# Patient Record
Sex: FEMALE | Race: WHITE | NOT HISPANIC OR LATINO | Employment: FULL TIME | URBAN - METROPOLITAN AREA
[De-identification: names, ages, dates, MRNs, and addresses within clinical notes are randomized per-mention and may not be internally consistent; named-entity substitution may affect disease eponyms.]

---

## 2020-08-18 ENCOUNTER — TELEPHONE (OUTPATIENT)
Dept: UROLOGY | Facility: CLINIC | Age: 55
End: 2020-08-18

## 2020-08-21 ENCOUNTER — TELEPHONE (OUTPATIENT)
Dept: UROLOGY | Facility: CLINIC | Age: 55
End: 2020-08-21

## 2020-08-21 NOTE — TELEPHONE ENCOUNTER
No documentation regarding call patient received. Confirmed appt and records w/patient.    ----- Message from Mariah Brito sent at 8/21/2020 10:04 AM CDT -----  Regarding: missed call  Type: Patient Call Back    Who called:Self    What is the request in detail:Returning missed call to office    Can the clinic reply by [facility]? no    Would the patient rather a call back or a response via [facility]? Callback    Best call back number:[phone]

## 2020-08-24 ENCOUNTER — TELEPHONE (OUTPATIENT)
Dept: UROLOGY | Facility: CLINIC | Age: 55
End: 2020-08-24

## 2020-08-24 NOTE — TELEPHONE ENCOUNTER
Gave fax # to patient for records.    ----- Message from Teresa Erazo MA sent at 8/21/2020 11:38 AM CDT -----    ----- Message -----  From: Jada Stewart  Sent: 8/21/2020  10:29 AM CDT  To: Maximiliano Ellis Staff    Type: Patient Call Back       What is the request in detail:  pt calling to speak to a nurse regarding health.      Can the clinic reply by [facility]? No       Would the patient rather a call back or a response via [facility]? Call back       Best call back number: [phone]

## 2020-08-31 ENCOUNTER — OFFICE VISIT (OUTPATIENT)
Dept: UROLOGY | Facility: CLINIC | Age: 55
End: 2020-08-31
Attending: UROLOGY
Payer: COMMERCIAL

## 2020-08-31 VITALS
BODY MASS INDEX: 23.66 KG/M2 | HEART RATE: 72 BPM | DIASTOLIC BLOOD PRESSURE: 57 MMHG | SYSTOLIC BLOOD PRESSURE: 89 MMHG | WEIGHT: 142 LBS | HEIGHT: 65 IN

## 2020-08-31 DIAGNOSIS — Z96.0 RETAINED URETERAL STENT: ICD-10-CM

## 2020-08-31 DIAGNOSIS — N21.0 BLADDER STONE: Primary | ICD-10-CM

## 2020-08-31 DIAGNOSIS — Z01.818 PRE-OP TESTING: ICD-10-CM

## 2020-08-31 DIAGNOSIS — N26.1 ATROPHY OF KIDNEY (TERMINAL): ICD-10-CM

## 2020-08-31 DIAGNOSIS — R31.0 GROSS HEMATURIA: ICD-10-CM

## 2020-08-31 DIAGNOSIS — Z96.0 RETAINED URETERAL STENT: Primary | ICD-10-CM

## 2020-08-31 PROCEDURE — 99204 PR OFFICE/OUTPT VISIT, NEW, LEVL IV, 45-59 MIN: ICD-10-PCS | Mod: S$GLB,,, | Performed by: UROLOGY

## 2020-08-31 PROCEDURE — 87086 URINE CULTURE/COLONY COUNT: CPT

## 2020-08-31 PROCEDURE — 99204 OFFICE O/P NEW MOD 45 MIN: CPT | Mod: S$GLB,,, | Performed by: UROLOGY

## 2020-08-31 PROCEDURE — 87088 URINE BACTERIA CULTURE: CPT

## 2020-08-31 PROCEDURE — 3008F PR BODY MASS INDEX (BMI) DOCUMENTED: ICD-10-PCS | Mod: CPTII,S$GLB,, | Performed by: UROLOGY

## 2020-08-31 PROCEDURE — 3008F BODY MASS INDEX DOCD: CPT | Mod: CPTII,S$GLB,, | Performed by: UROLOGY

## 2020-08-31 PROCEDURE — 87077 CULTURE AEROBIC IDENTIFY: CPT

## 2020-08-31 PROCEDURE — 87186 SC STD MICRODIL/AGAR DIL: CPT

## 2020-08-31 RX ORDER — FLUCONAZOLE 150 MG/1
TABLET ORAL
COMMUNITY
Start: 2020-06-01 | End: 2020-09-02 | Stop reason: CLARIF

## 2020-08-31 RX ORDER — CIPROFLOXACIN 2 MG/ML
400 INJECTION, SOLUTION INTRAVENOUS
Status: CANCELLED | OUTPATIENT
Start: 2020-08-31

## 2020-08-31 RX ORDER — ALPRAZOLAM 1 MG/1
TABLET ORAL
COMMUNITY
Start: 2020-08-10

## 2020-08-31 RX ORDER — TRAZODONE HYDROCHLORIDE 150 MG/1
TABLET ORAL
COMMUNITY
Start: 2020-08-10

## 2020-08-31 NOTE — LETTER
August 31, 2020        Isaak Keita Jr., MD  [address] [facility] [place] [address]             [facility] [place] [id number] [facility], [address] [address]  Phone: [phone]  Fax: [phone]   Patient: Tamara Frausto   MR Number: [id number]   YOB: 1965   Date of Visit: 8/31/2020       Dear Dr. Keita:    Thank you for referring Tamara Frausto to me for evaluation. Attached you will find relevant portions of my assessment and plan of care.    If you have questions, please do not hesitate to call me. I look forward to following Tamara Frausto along with you.    Sincerely,      Caleb Viera MD            CC  No Recipients    Enclosure

## 2020-08-31 NOTE — LETTER
August 31, 2020    Tamara Frausto  [address]  [place] [address]         [facility]  [facility], [address] [address]  Phone: [phone]  Fax: [phone] August 31, 2020     Patient: Tamara Frausto   YOB: 1965   Date of Visit: 8/31/2020       To Whom It May Concern:    Dr. Keita referred Tamara Frausto to me and she will be under my care for a chronic illness starting 08/31/2020 and continuing at least through 9/9/2020. It is possible that she will need additional treatment beyond that time as well.    If you have any questions or concerns, please don't hesitate to call.    Sincerely,        Caleb Viera MD

## 2020-08-31 NOTE — PROGRESS NOTES
"Subjective:      Tamara Frausto is a 55 y.o. female who was referred by Dr. Isaak Keita for evaluation of encrusted ureteral stent.      She reports always having issues with her kidneys. 18 years ago she underwent a right ureteral stent placed for "malformation of the kidney". Her urologist left the city and she was lost to follow.   She began having flank pain over the last 6-8 months that has been progressive.   Reports extreme urinary symptoms significant for urinary frequency, urgency incontinence, stress urinary incontinence. She reports hematuria with blood clots and reports "tissue" separate from clots.     CT w/wo: 3.8 cm stone in the renal pelvis at the proximal coil, there is atrophy of the right kidney. The stent is completely fractured at the mid ureter with the distal coil in the bladder with significant stone burden, 2 separate stones, the largest being 6 cm. The left kidney shows no abnormalities.   KUB large stone surround the proximal stent 2.4 x 3.8 cm with additional calcifications along the course of the stent. The stent is fractured at the mid ureter. Calcifications over the distal end of the stent measuring 2.9 x 6.1 and 2.7 x 3.5 cm.    She takes trazodone and alprazolam for sleep and bupropion as an antidepressant.  Current smoker about 1/2 ppd over the last 40 years.    The following portions of the patient's history were reviewed and updated as appropriate: allergies, current medications, past family history, past medical history, past social history, past surgical history and problem list.    Review of Systems  Constitutional: no fever or chills  ENT: no nasal congestion or sore throat  Respiratory: no cough or shortness of breath  Cardiovascular: no chest pain or palpitations  Gastrointestinal: no nausea or vomiting, tolerating diet  Genitourinary: as per HPI  Hematologic/Lymphatic: no easy bruising or lymphadenopathy  Musculoskeletal: no arthralgias or myalgias  Neurological: no " "seizures or tremors  Behavioral/Psych: no auditory or visual hallucinations     Objective:   Vital Signs:BP (!) 89/57 (BP Location: Left arm, Patient Position: Sitting, BP Method: Large (Automatic))   Pulse 72   Ht 5' 5" (1.651 m)   Wt 64.4 kg (142 lb)   BMI 23.63 kg/m²     Physical Exam   General: alert and oriented, no acute distress  Head: normocephalic, atraumatic  Neck: normal ROM,   Respiratory: Symmetric expansion, non-labored breathing  Cardiovascular: regular rate and rhythm, nomal pulses, no peripheral edema  Abdomen: soft, non tender, non distended, no palpable masses, no hernias, no hepatomegaly or splenomegaly  Skin: normal coloration and turgor, no rashes, no suspicious skin lesions noted  Neuro: alert and oriented x3, no gross deficits  Psych: normal judgment and insight, normal mood/affect and non-anxious    Lab Review   Urinalysis demonstrates positive for nitrites, leukocytes, red blood cells, protein  No results found for: WBC, HGB, HCT, MCV, PLT  No results found for: CREATININE, BUN    Imaging   CT w/wo: 3.8 cm stone in the renal pelvis at the proximal coil, there is atrophy of the right kidney. The stent is completely fractured at the mid ureter with the distal coil in the bladder with significant stone burden, 2 separate stones, the largest being 6 cm. The left kidney shows no abnormalities.   KUB: there is a large stone surround the proximal stent 2.4 x 3.8 cm with additional calcifications along the course of the stent. The stent is fractured at the mid ureter. Calcifications over the distal end of the stent measuring 2.9 x 6.1 and 2.7 x 3.5 cm.    Assessment:     1. Bladder stone    2. Retained ureteral stent    3. Gross hematuria    4. Atrophy of kidney (terminal)      Plan:   1. Cystoscopy with cystolitholapaxy on 9/9/20 to manage bladder stones and distal coil. If these are not amenable to an endoscopic removal, an open/robotic approach may be preferred at the the time of kidney " intervention.  2. Plan for a MAG3 renal scan in the next few weeks to determine differential function. Possible nephrectomy vs PCNL pending those results  3. Cystoscopy at the time of cystolitholapaxy  4. MAG 3 as above

## 2020-09-01 ENCOUNTER — TELEPHONE (OUTPATIENT)
Dept: UROLOGY | Facility: CLINIC | Age: 55
End: 2020-09-01

## 2020-09-01 NOTE — TELEPHONE ENCOUNTER
----- Message from Anais Shearer, RN sent at 9/1/2020  1:36 PM CDT -----  Patient requests a work note for surgery.  ----- Message -----  From: Rafaela Guidry, Patient Care Assistant  Sent: 9/1/2020  12:04 PM CDT  To: Maximiliano Ellis Staff    Name of Who is Calling: MATA GODINEZ [[id number]]    What is the request in detail:Requesting a call back in regards of work excuse and Xray appointment.  Please contact to further discuss and advise      Can the clinic reply by [facility]: No    What Number to Call Back if not in [facility]:  Work ph [phone] ext [phone]

## 2020-09-02 ENCOUNTER — ANESTHESIA EVENT (OUTPATIENT)
Dept: SURGERY | Facility: OTHER | Age: 55
End: 2020-09-02
Payer: COMMERCIAL

## 2020-09-02 ENCOUNTER — HOSPITAL ENCOUNTER (OUTPATIENT)
Dept: PREADMISSION TESTING | Facility: OTHER | Age: 55
Discharge: HOME | End: 2020-09-02
Attending: UROLOGY
Payer: COMMERCIAL

## 2020-09-02 ENCOUNTER — HOSPITAL ENCOUNTER (OUTPATIENT)
Dept: RADIOLOGY | Facility: OTHER | Age: 55
Discharge: HOME | End: 2020-09-02
Attending: UROLOGY
Payer: COMMERCIAL

## 2020-09-02 VITALS
OXYGEN SATURATION: 95 % | TEMPERATURE: 98 F | BODY MASS INDEX: 24.24 KG/M2 | DIASTOLIC BLOOD PRESSURE: 63 MMHG | HEIGHT: 64 IN | WEIGHT: 142 LBS | SYSTOLIC BLOOD PRESSURE: 108 MMHG | HEART RATE: 67 BPM

## 2020-09-02 DIAGNOSIS — N26.1 ATROPHY OF KIDNEY (TERMINAL): ICD-10-CM

## 2020-09-02 PROCEDURE — A9562 TC99M MERTIATIDE: HCPCS

## 2020-09-02 PROCEDURE — 63600175 PHARM REV CODE 636 W HCPCS: Performed by: UROLOGY

## 2020-09-02 PROCEDURE — 78708 K FLOW/FUNCT IMAGE W/DRUG: CPT | Mod: 26,,, | Performed by: RADIOLOGY

## 2020-09-02 PROCEDURE — 78708 NM KIDNEY W FLOW AND FUNCTION W PHARMACOLOGICAL: ICD-10-PCS | Mod: 26,,, | Performed by: RADIOLOGY

## 2020-09-02 RX ORDER — LIDOCAINE HYDROCHLORIDE 10 MG/ML
0.5 INJECTION, SOLUTION EPIDURAL; INFILTRATION; INTRACAUDAL; PERINEURAL ONCE
Status: CANCELLED | OUTPATIENT
Start: 2020-09-02 | End: 2020-09-02

## 2020-09-02 RX ORDER — BUPROPION HYDROCHLORIDE 150 MG/1
150 TABLET ORAL DAILY
COMMUNITY
End: 2020-10-15 | Stop reason: SDUPTHER

## 2020-09-02 RX ORDER — FUROSEMIDE 10 MG/ML
40 INJECTION INTRAMUSCULAR; INTRAVENOUS ONCE
Status: COMPLETED | OUTPATIENT
Start: 2020-09-02 | End: 2020-09-02

## 2020-09-02 RX ORDER — SODIUM CHLORIDE, SODIUM LACTATE, POTASSIUM CHLORIDE, CALCIUM CHLORIDE 600; 310; 30; 20 MG/100ML; MG/100ML; MG/100ML; MG/100ML
INJECTION, SOLUTION INTRAVENOUS CONTINUOUS
Status: CANCELLED | OUTPATIENT
Start: 2020-09-02

## 2020-09-02 RX ORDER — ACETAMINOPHEN 500 MG
1000 TABLET ORAL
Status: CANCELLED | OUTPATIENT
Start: 2020-09-02 | End: 2020-09-02

## 2020-09-02 RX ORDER — PREGABALIN 75 MG/1
75 CAPSULE ORAL ONCE
Status: CANCELLED | OUTPATIENT
Start: 2020-09-02 | End: 2020-09-02

## 2020-09-02 RX ADMIN — FUROSEMIDE 40 MG: 10 INJECTION, SOLUTION INTRAMUSCULAR; INTRAVENOUS at 11:09

## 2020-09-02 NOTE — ANESTHESIA PREPROCEDURE EVALUATION
09/02/2020  Tamara Frausto is a 55 y.o., female.    Anesthesia Evaluation    I have reviewed the Patient Summary Reports.    I have reviewed the Nursing Notes.    I have reviewed the Medications.     Review of Systems  Anesthesia Hx:  No problems with previous Anesthesia  Denies Family Hx of Anesthesia complications.   Denies Personal Hx of Anesthesia complications.   Social:  Smoker    Hematology/Oncology:  Hematology Normal   Oncology Normal   Oncology Comments: Left shoulder skin melanoma     EENT/Dental:EENT/Dental Normal   Cardiovascular:  Cardiovascular Normal     Pulmonary:  Pulmonary Normal    Renal/:  Renal/ Normal  Retained stent   Hepatic/GI:  Hepatic/GI Normal    Musculoskeletal:  Musculoskeletal Normal    Neurological:  Neurology Normal    Endocrine:  Endocrine Normal    Dermatological:  Skin Normal    Psych:  Psychiatric Normal           Physical Exam  General:  Well nourished    Airway/Jaw/Neck:  Airway Findings: Mouth Opening: Normal Mallampati: II      Dental:  Dental Findings: In tact, Upper Dentures        Mental Status:  Mental Status Findings:  Cooperative, Alert and Oriented         Anesthesia Plan  Type of Anesthesia, risks & benefits discussed:  Anesthesia Type:  general  Patient's Preference:   Intra-op Monitoring Plan: standard ASA monitors  Intra-op Monitoring Plan Comments:   Post Op Pain Control Plan: multimodal analgesia  Post Op Pain Control Plan Comments:   Induction:   IV  Beta Blocker:         Informed Consent: Patient understands risks and agrees with Anesthesia plan.  Questions answered. Anesthesia consent signed with patient.  ASA Score: 2     Day of Surgery Review of History & Physical:    H&P update referred to the surgeon.     Anesthesia Plan Notes: Pt requests devices on right due to axillary node issues on Left during melanoma surgery        Ready For  Surgery From Anesthesia Perspective.

## 2020-09-03 ENCOUNTER — PATIENT MESSAGE (OUTPATIENT)
Dept: UROLOGY | Facility: CLINIC | Age: 55
End: 2020-09-03

## 2020-09-03 ENCOUNTER — TELEPHONE (OUTPATIENT)
Dept: UROLOGY | Facility: CLINIC | Age: 55
End: 2020-09-03

## 2020-09-03 LAB — BACTERIA UR CULT: ABNORMAL

## 2020-09-03 RX ORDER — SULFAMETHOXAZOLE AND TRIMETHOPRIM 800; 160 MG/1; MG/1
1 TABLET ORAL 2 TIMES DAILY
Qty: 14 TABLET | Refills: 0 | Status: SHIPPED | OUTPATIENT
Start: 2020-09-03 | End: 2020-09-10

## 2020-09-03 NOTE — TELEPHONE ENCOUNTER
"Patient requesting work excuse for 9/2 preop and 9/8 procedure.   Patient states, "it has to be signed by Caleb Viera MD."  Thanks!    ----- Message from Louann Borges sent at 9/3/2020  3:06 PM CDT -----  Regarding: Patient Access  Name of Who is Calling: Tamara Frausto      What is the request in detail:   Patient called requesting a doctor's letter for Monday 09/02/2020 Pre-op visit and Tuesday's 09/08/2020 up coming procedure to submit to her employer. Patient requesting the letter be emailed to her.  Patient states, "it has to be signed by Caleb Viera MD."    Please further advise    Reply by [facility]:  no    Call Back : [phone]                                          "

## 2020-09-03 NOTE — TELEPHONE ENCOUNTER
Spoke to the patient and she would like to the procedure on 09/08/20, covid test 09/05/20 urgent care , surgery dept notified.

## 2020-09-03 NOTE — TELEPHONE ENCOUNTER
----- Message from Caleb Viera MD sent at 9/3/2020  8:52 AM CDT -----  Pt has positive urine culture.  Prescription for Bactrim sent to listed preferred pharmacy.  Please call patient to notify.  Follow-up for surgery next week as scheduled.  Thanks.

## 2020-09-03 NOTE — TELEPHONE ENCOUNTER
----- Message from Julissa Hang sent at 9/2/2020  4:57 PM CDT -----  Type:  Patient Returning Call    Who Called:  pt     Who Left Message for Patient:  Laxmi    Does the patient know what this is regarding?: no    Would the patient rather a call back or a response via [facility]? Call back     Best Call Back Number: [phone]    Additional Information:

## 2020-09-03 NOTE — TELEPHONE ENCOUNTER
This note is in her chart. I wrote it for her the other day but forgot to give it to her before she left. Meet was going to mail it to her. I'm not sure how to send it by email unless we print and then scan it.

## 2020-09-03 NOTE — TELEPHONE ENCOUNTER
----- Message from Susan Lowery sent at 9/3/2020 11:14 AM CDT -----  Contact: [phone]  Would like to consult with the nurse, patient would like to get a Dr excuse for work, patient states that she is not feeling well,  patient would like to speak with the nurse concerning this, please call back thanks sj

## 2020-09-04 ENCOUNTER — TELEPHONE (OUTPATIENT)
Dept: UROLOGY | Facility: CLINIC | Age: 55
End: 2020-09-04

## 2020-09-06 ENCOUNTER — LAB VISIT (OUTPATIENT)
Dept: PRIMARY CARE CLINIC | Facility: CLINIC | Age: 55
End: 2020-09-06
Payer: COMMERCIAL

## 2020-09-06 DIAGNOSIS — Z01.818 PRE-OP TESTING: ICD-10-CM

## 2020-09-06 DIAGNOSIS — Z01.818 PREOPERATIVE CLEARANCE: ICD-10-CM

## 2020-09-06 DIAGNOSIS — Z96.0 RETAINED URETERAL STENT: ICD-10-CM

## 2020-09-06 PROCEDURE — U0003 INFECTIOUS AGENT DETECTION BY NUCLEIC ACID (DNA OR RNA); SEVERE ACUTE RESPIRATORY SYNDROME CORONAVIRUS 2 (SARS-COV-2) (CORONAVIRUS DISEASE [COVID-19]), AMPLIFIED PROBE TECHNIQUE, MAKING USE OF HIGH THROUGHPUT TECHNOLOGIES AS DESCRIBED BY CMS-2020-01-R: HCPCS

## 2020-09-07 LAB — SARS-COV-2 RNA RESP QL NAA+PROBE: NOT DETECTED

## 2020-09-08 ENCOUNTER — HOSPITAL ENCOUNTER (OUTPATIENT)
Facility: OTHER | Age: 55
Discharge: HOME | End: 2020-09-08
Attending: UROLOGY | Admitting: UROLOGY
Payer: COMMERCIAL

## 2020-09-08 ENCOUNTER — TELEPHONE (OUTPATIENT)
Dept: UROLOGY | Facility: CLINIC | Age: 55
End: 2020-09-08

## 2020-09-08 ENCOUNTER — ANESTHESIA (OUTPATIENT)
Dept: SURGERY | Facility: OTHER | Age: 55
End: 2020-09-08
Payer: COMMERCIAL

## 2020-09-08 VITALS
SYSTOLIC BLOOD PRESSURE: 135 MMHG | TEMPERATURE: 98 F | WEIGHT: 142 LBS | OXYGEN SATURATION: 99 % | RESPIRATION RATE: 18 BRPM | BODY MASS INDEX: 24.24 KG/M2 | DIASTOLIC BLOOD PRESSURE: 78 MMHG | HEIGHT: 64 IN | HEART RATE: 64 BPM

## 2020-09-08 DIAGNOSIS — Z96.0 RETAINED URETERAL STENT: ICD-10-CM

## 2020-09-08 DIAGNOSIS — N21.0 BLADDER STONE: ICD-10-CM

## 2020-09-08 DIAGNOSIS — R31.0 GROSS HEMATURIA: ICD-10-CM

## 2020-09-08 PROCEDURE — 25000003 PHARM REV CODE 250: Performed by: ANESTHESIOLOGY

## 2020-09-08 PROCEDURE — 88300 SURGICAL PATH GROSS: CPT | Performed by: PATHOLOGY

## 2020-09-08 PROCEDURE — 88300 SURGICAL PATH GROSS: CPT | Mod: 26,,, | Performed by: PATHOLOGY

## 2020-09-08 PROCEDURE — 25000003 PHARM REV CODE 250: Performed by: NURSE ANESTHETIST, CERTIFIED REGISTERED

## 2020-09-08 PROCEDURE — 36000706: Performed by: UROLOGY

## 2020-09-08 PROCEDURE — 37000008 HC ANESTHESIA 1ST 15 MINUTES: Performed by: UROLOGY

## 2020-09-08 PROCEDURE — 63600175 PHARM REV CODE 636 W HCPCS: Performed by: ANESTHESIOLOGY

## 2020-09-08 PROCEDURE — 52318 REMOVE BLADDER STONE: CPT | Mod: 22,,, | Performed by: UROLOGY

## 2020-09-08 PROCEDURE — 88300 PR  SURG PATH,GROSS,LEVEL I: ICD-10-PCS | Mod: 26,,, | Performed by: PATHOLOGY

## 2020-09-08 PROCEDURE — 27201423 OPTIME MED/SURG SUP & DEVICES STERILE SUPPLY: Performed by: UROLOGY

## 2020-09-08 PROCEDURE — 37000009 HC ANESTHESIA EA ADD 15 MINS: Performed by: UROLOGY

## 2020-09-08 PROCEDURE — 36000707: Performed by: UROLOGY

## 2020-09-08 PROCEDURE — C1758 CATHETER, URETERAL: HCPCS | Performed by: UROLOGY

## 2020-09-08 PROCEDURE — 52318 PR LITHOLOPAXY; BY ANY MEANS, COMPLICATED/LARGE >2.5CM: ICD-10-PCS | Mod: 22,,, | Performed by: UROLOGY

## 2020-09-08 PROCEDURE — 71000033 HC RECOVERY, INTIAL HOUR: Performed by: UROLOGY

## 2020-09-08 PROCEDURE — 63600175 PHARM REV CODE 636 W HCPCS: Performed by: UROLOGY

## 2020-09-08 PROCEDURE — 71000015 HC POSTOP RECOV 1ST HR: Performed by: UROLOGY

## 2020-09-08 PROCEDURE — 63600175 PHARM REV CODE 636 W HCPCS: Performed by: NURSE ANESTHETIST, CERTIFIED REGISTERED

## 2020-09-08 RX ORDER — ACETAMINOPHEN 500 MG
1000 TABLET ORAL
Status: COMPLETED | OUTPATIENT
Start: 2020-09-08 | End: 2020-09-08

## 2020-09-08 RX ORDER — LIDOCAINE HYDROCHLORIDE 10 MG/ML
0.5 INJECTION, SOLUTION EPIDURAL; INFILTRATION; INTRACAUDAL; PERINEURAL ONCE
Status: DISCONTINUED | OUTPATIENT
Start: 2020-09-08 | End: 2020-09-08 | Stop reason: HOSPADM

## 2020-09-08 RX ORDER — KETOROLAC TROMETHAMINE 30 MG/ML
15 INJECTION, SOLUTION INTRAMUSCULAR; INTRAVENOUS EVERY 6 HOURS PRN
Status: CANCELLED | OUTPATIENT
Start: 2020-09-08 | End: 2020-09-11

## 2020-09-08 RX ORDER — DIPHENHYDRAMINE HYDROCHLORIDE 50 MG/ML
12.5 INJECTION INTRAMUSCULAR; INTRAVENOUS EVERY 30 MIN PRN
Status: DISCONTINUED | OUTPATIENT
Start: 2020-09-08 | End: 2020-09-08 | Stop reason: HOSPADM

## 2020-09-08 RX ORDER — ACETAMINOPHEN 325 MG/1
650 TABLET ORAL EVERY 4 HOURS PRN
Status: CANCELLED | OUTPATIENT
Start: 2020-09-08

## 2020-09-08 RX ORDER — PREGABALIN 75 MG/1
75 CAPSULE ORAL ONCE
Status: COMPLETED | OUTPATIENT
Start: 2020-09-08 | End: 2020-09-08

## 2020-09-08 RX ORDER — CIPROFLOXACIN 2 MG/ML
400 INJECTION, SOLUTION INTRAVENOUS
Status: COMPLETED | OUTPATIENT
Start: 2020-09-08 | End: 2020-09-08

## 2020-09-08 RX ORDER — SODIUM CHLORIDE, SODIUM LACTATE, POTASSIUM CHLORIDE, CALCIUM CHLORIDE 600; 310; 30; 20 MG/100ML; MG/100ML; MG/100ML; MG/100ML
INJECTION, SOLUTION INTRAVENOUS CONTINUOUS
Status: DISCONTINUED | OUTPATIENT
Start: 2020-09-08 | End: 2020-09-08 | Stop reason: HOSPADM

## 2020-09-08 RX ORDER — OXYCODONE HYDROCHLORIDE 5 MG/1
5 TABLET ORAL
Status: DISCONTINUED | OUTPATIENT
Start: 2020-09-08 | End: 2020-09-08 | Stop reason: HOSPADM

## 2020-09-08 RX ORDER — ONDANSETRON 2 MG/ML
INJECTION INTRAMUSCULAR; INTRAVENOUS
Status: DISCONTINUED | OUTPATIENT
Start: 2020-09-08 | End: 2020-09-08

## 2020-09-08 RX ORDER — EPHEDRINE SULFATE 50 MG/ML
INJECTION, SOLUTION INTRAVENOUS
Status: DISCONTINUED | OUTPATIENT
Start: 2020-09-08 | End: 2020-09-08

## 2020-09-08 RX ORDER — MEPERIDINE HYDROCHLORIDE 25 MG/ML
12.5 INJECTION INTRAMUSCULAR; INTRAVENOUS; SUBCUTANEOUS ONCE AS NEEDED
Status: DISCONTINUED | OUTPATIENT
Start: 2020-09-08 | End: 2020-09-08 | Stop reason: HOSPADM

## 2020-09-08 RX ORDER — SODIUM CHLORIDE 0.9 % (FLUSH) 0.9 %
3 SYRINGE (ML) INJECTION
Status: DISCONTINUED | OUTPATIENT
Start: 2020-09-08 | End: 2020-09-08 | Stop reason: HOSPADM

## 2020-09-08 RX ORDER — OXYBUTYNIN CHLORIDE 5 MG/1
5 TABLET ORAL 3 TIMES DAILY PRN
Qty: 30 TABLET | Refills: 0 | Status: SHIPPED | OUTPATIENT
Start: 2020-09-08 | End: 2021-09-08

## 2020-09-08 RX ORDER — HYDROMORPHONE HYDROCHLORIDE 2 MG/ML
0.4 INJECTION, SOLUTION INTRAMUSCULAR; INTRAVENOUS; SUBCUTANEOUS EVERY 5 MIN PRN
Status: DISCONTINUED | OUTPATIENT
Start: 2020-09-08 | End: 2020-09-08 | Stop reason: HOSPADM

## 2020-09-08 RX ORDER — LIDOCAINE HCL/PF 100 MG/5ML
SYRINGE (ML) INTRAVENOUS
Status: DISCONTINUED | OUTPATIENT
Start: 2020-09-08 | End: 2020-09-08

## 2020-09-08 RX ORDER — PHENYLEPHRINE HYDROCHLORIDE 10 MG/ML
INJECTION INTRAVENOUS
Status: DISCONTINUED | OUTPATIENT
Start: 2020-09-08 | End: 2020-09-08

## 2020-09-08 RX ORDER — PHENAZOPYRIDINE HYDROCHLORIDE 100 MG/1
200 TABLET, FILM COATED ORAL EVERY 8 HOURS PRN
Qty: 20 TABLET | Refills: 0 | Status: SHIPPED | OUTPATIENT
Start: 2020-09-08

## 2020-09-08 RX ORDER — MIDAZOLAM HYDROCHLORIDE 1 MG/ML
INJECTION INTRAMUSCULAR; INTRAVENOUS
Status: DISCONTINUED | OUTPATIENT
Start: 2020-09-08 | End: 2020-09-08

## 2020-09-08 RX ORDER — PROPOFOL 10 MG/ML
VIAL (ML) INTRAVENOUS
Status: DISCONTINUED | OUTPATIENT
Start: 2020-09-08 | End: 2020-09-08

## 2020-09-08 RX ORDER — ONDANSETRON 2 MG/ML
4 INJECTION INTRAMUSCULAR; INTRAVENOUS EVERY 12 HOURS PRN
Status: CANCELLED | OUTPATIENT
Start: 2020-09-08

## 2020-09-08 RX ORDER — FENTANYL CITRATE 50 UG/ML
INJECTION, SOLUTION INTRAMUSCULAR; INTRAVENOUS
Status: DISCONTINUED | OUTPATIENT
Start: 2020-09-08 | End: 2020-09-08

## 2020-09-08 RX ORDER — PHENAZOPYRIDINE HYDROCHLORIDE 200 MG/1
200 TABLET, FILM COATED ORAL ONCE AS NEEDED
Status: DISCONTINUED | OUTPATIENT
Start: 2020-09-08 | End: 2020-09-08 | Stop reason: HOSPADM

## 2020-09-08 RX ORDER — ONDANSETRON 2 MG/ML
4 INJECTION INTRAMUSCULAR; INTRAVENOUS DAILY PRN
Status: DISCONTINUED | OUTPATIENT
Start: 2020-09-08 | End: 2020-09-08 | Stop reason: HOSPADM

## 2020-09-08 RX ORDER — DEXAMETHASONE SODIUM PHOSPHATE 4 MG/ML
INJECTION, SOLUTION INTRA-ARTICULAR; INTRALESIONAL; INTRAMUSCULAR; INTRAVENOUS; SOFT TISSUE
Status: DISCONTINUED | OUTPATIENT
Start: 2020-09-08 | End: 2020-09-08

## 2020-09-08 RX ADMIN — LIDOCAINE HYDROCHLORIDE 100 MG: 20 INJECTION, SOLUTION INTRAVENOUS at 02:09

## 2020-09-08 RX ADMIN — DEXAMETHASONE SODIUM PHOSPHATE 8 MG: 4 INJECTION, SOLUTION INTRAMUSCULAR; INTRAVENOUS at 02:09

## 2020-09-08 RX ADMIN — EPHEDRINE SULFATE 10 MG: 50 INJECTION INTRAVENOUS at 03:09

## 2020-09-08 RX ADMIN — CIPROFLOXACIN 400 MG: 2 INJECTION, SOLUTION INTRAVENOUS at 02:09

## 2020-09-08 RX ADMIN — EPHEDRINE SULFATE 10 MG: 50 INJECTION INTRAVENOUS at 02:09

## 2020-09-08 RX ADMIN — ACETAMINOPHEN 1000 MG: 500 TABLET, FILM COATED ORAL at 12:09

## 2020-09-08 RX ADMIN — ONDANSETRON HYDROCHLORIDE 4 MG: 2 INJECTION INTRAMUSCULAR; INTRAVENOUS at 02:09

## 2020-09-08 RX ADMIN — PHENYLEPHRINE HYDROCHLORIDE 100 MCG: 10 INJECTION INTRAVENOUS at 03:09

## 2020-09-08 RX ADMIN — FENTANYL CITRATE 100 MCG: 50 INJECTION, SOLUTION INTRAMUSCULAR; INTRAVENOUS at 02:09

## 2020-09-08 RX ADMIN — SODIUM CHLORIDE, SODIUM LACTATE, POTASSIUM CHLORIDE, AND CALCIUM CHLORIDE: 600; 310; 30; 20 INJECTION, SOLUTION INTRAVENOUS at 04:09

## 2020-09-08 RX ADMIN — SODIUM CHLORIDE, SODIUM LACTATE, POTASSIUM CHLORIDE, AND CALCIUM CHLORIDE: 600; 310; 30; 20 INJECTION, SOLUTION INTRAVENOUS at 02:09

## 2020-09-08 RX ADMIN — MIDAZOLAM HYDROCHLORIDE 2 MG: 1 INJECTION, SOLUTION INTRAMUSCULAR; INTRAVENOUS at 02:09

## 2020-09-08 RX ADMIN — PROPOFOL 160 MG: 10 INJECTION, EMULSION INTRAVENOUS at 02:09

## 2020-09-08 RX ADMIN — PREGABALIN 75 MG: 75 CAPSULE ORAL at 12:09

## 2020-09-08 NOTE — TRANSFER OF CARE
"Anesthesia Transfer of Care Note    Patient: Tamara Frausto    Procedure(s) Performed: Procedure(s) (LRB):  CYSTOLITHOLAPAXY (N/A)    Patient location: PACU    Anesthesia Type: general    Transport from OR: Transported from OR on 2-3 L/min O2 by NC with adequate spontaneous ventilation    Post pain: adequate analgesia    Post assessment: no apparent anesthetic complications    Post vital signs: stable    Level of consciousness: awake and alert    Nausea/Vomiting: no nausea/vomiting    Complications: none    Transfer of care protocol was followed      Last vitals:   Visit Vitals  /75 (BP Location: Right arm, Patient Position: Sitting)   Pulse 75   Temp 37 °C (98.6 °F) (Oral)   Resp 18   Ht 5' 4" (1.626 m)   Wt 64.4 kg (142 lb)   SpO2 96%   Breastfeeding No   BMI 24.37 kg/m²     "

## 2020-09-08 NOTE — ANESTHESIA POSTPROCEDURE EVALUATION
Anesthesia Post Evaluation    Patient: Tamara Frausto    Procedure(s) Performed: Procedure(s) (LRB):  CYSTOLITHOLAPAXY (N/A)    Final Anesthesia Type: general    Patient location during evaluation: PACU  Patient participation: Yes- Able to Participate  Level of consciousness: awake and alert  Post-procedure vital signs: reviewed and stable  Pain management: adequate  Airway patency: patent    PONV status at discharge: No PONV  Anesthetic complications: no      Cardiovascular status: blood pressure returned to baseline and stable  Respiratory status: unassisted, spontaneous ventilation and room air  Hydration status: euvolemic  Follow-up not needed.          Vitals Value Taken Time   /69 09/08/20 1732   Temp 36.2 °C (97.2 °F) 09/08/20 1715   Pulse 80 09/08/20 1735   Resp 18 09/08/20 1715   SpO2 97 % 09/08/20 1735   Vitals shown include unvalidated device data.      No case tracking events are documented in the log.      Pain/Jean Claude Score: Pain Rating Prior to Med Admin: 0 (9/8/2020 12:49 PM)  Jean Claude Score: 10 (9/8/2020  5:37 PM)

## 2020-09-08 NOTE — PLAN OF CARE
Tamara Frausto has met all discharge criteria from Phase II. Vital Signs are stable, ambulating  without difficulty. Discharge instructions given, patient verbalized understanding. Discharged from facility via wheelchair in stable condition.

## 2020-09-08 NOTE — OP NOTE
Operative Note       Surgery Date: 9/8/2020     Surgeon: Beltran Viera MD    Assistant Surgeon: Andrea Diamond MD    Pre-op Diagnosis:  Retained ureteral stent [Z96.0]  Gross hematuria [R31.0]  Bladder stone [N21.0]    Post-op Diagnosis: Post-Op Diagnosis Codes:     * Retained ureteral stent [Z96.0]     * Gross hematuria [R31.0]     * Bladder stone [N21.0]    Procedures:  Procedure(s) (LRB):  CYSTOLITHOLAPAXY (COMPLICATED - 22 MODIFIER)    Anesthesia: General    Indications for Procedure:  The patient is a 55 y.o. year old female with retained right ureteral stent for 18 years with very large bladder and renal calcifications and non-function of the right kidney.  She presents today for surgical treatment of the distal calcifications with cystolithalopaxy.  The full risks and benefits of the procedure have been explained and detail and she wishes to proceed.    Procedure Description:  The patient was brought to the operative suite and placed under General anesthesia. She was placed in lithotomy position and prepped and draped in usual sterile fashion.  Time out was performed prior to starting the procedure.     imaging demonstrated large calcifications in the bladder, a fractured ureteral stent, and a large stone in the renal pelvis. Rigid cytoscopy was performed with a 22 Khmer scope. This confirmed that there were 2 large stones in the bladder, the largest > 6cm. On fluoroscopic imaging it was clear that these stones were on 2 separate stent fragments within the bladder. The bladder mucosa had changes consistent with chronic inflammation but no evidence of malignancy.    The cystoscope was removed and the nephroscope was placed through the urethra using the obturator, which was then changed for the lens. Working through the nephroscope, the cyberwand was used to fragment the stones and extract fragments. The smaller stone was treated first, which was attached to the portion of the stent in the distal ureter. The  larger stone, which was on the pigtail portion of the stent, was treated in similar fashion. This was continued until the pigtail portion of the stent was cleared of most calcifications and appeared flexible. The stent fragments were then grasped and extracted through the urethra under fluoroscopic guidance without difficulty.     The 27 Czech resectoscope was then placed into the bladder and the Eelik evacuator was used to extract the stone fragments. Repeat cystoscopy demonstrated stone fragments too large to be extracted. The nephroscope was replaced and the cyberwand was used to further fragment the stones. The process of lithotripsy and evacuation through the 2 scopes was repeated until no visible stone fragments remained in the bladder.     Once completed, the bladder was fully visualized and there was no evidence of injury. The scope was removed. She was transferred to PACU in stable condition. We will contact her to schedule next appointment to arrange for nephrectomy.    This was a complicated procedure for which CPT modifier 22 is requested. The large volume of the 2 stones required considerable time to fully fragment. Over 90 minutes was spent using the cyberwand to complete full lithotripsy of the stones, well in excess of the typical 30 minutes or less required for such procedures.    Complications: No    Estimated Blood Loss: 0cc         Specimens Removed:   Specimen (12h ago, onward)    Stent fragments          Implants: * No implants in log *           Disposition: PACU - hemodynamically stable.           Condition: Good    Attestation:  I was present and scrubbed for the entire procedure.

## 2020-09-08 NOTE — TELEPHONE ENCOUNTER
----- Message from Avril Moody sent at 9/8/2020 11:33 AM CDT -----  Contact: Patient [phone]  Type: Patient Call Back    Who called: Patient    What is the request in detail: Had surgery this morning. Trying to get in the building, but she's being told that she can not enter because something is wrong with her COVID Test. Please call pt in regards to this.     Would the patient rather a call back or a response via [facility]?  Call back    Best call back number: [phone]

## 2020-09-08 NOTE — OR NURSING
assisted up to bathroom , voided 200ccs clear yellow urine, assisted back to bed for now, discharge instructions reviewed with pt and son.

## 2020-09-08 NOTE — ANESTHESIA PROCEDURE NOTES
Intubation  Performed by: Shavonne Akbar CRNA  Authorized by: Danette Mann MD     Intubation:     Induction:  Intravenous    Intubated:  Postinduction    Mask Ventilation:  Easy mask    Attempts:  1    Attempted By:  CRNA    Difficult Airway Encountered?: No      Complications:  None    Airway Device:  Supraglottic airway/LMA    Airway Device Size:  4.0    Style/Cuff Inflation:  Cuffed    Secured at:  The lips    Placement Verified By:  Capnometry    Complicating Factors:  None    Findings Post-Intubation:  BS equal bilateral and atraumatic/condition of teeth unchanged

## 2020-09-08 NOTE — ADDENDUM NOTE
Addendum  created 09/08/20 7831 by Shavonne Akbar CRNA    Child order released for a procedure order, Clinical Note Signed, Intraprocedure Blocks edited, LDA created via procedure documentation

## 2020-09-08 NOTE — BRIEF OP NOTE
[facility]  Brief Operative Note     SUMMARY     Surgery Date: 9/8/2020     Surgeon(s) and Role:     * Caleb Viera MD - Primary    Assisting Surgeon: None    Pre-op Diagnosis:  Retained ureteral stent [Z96.0]  Gross hematuria [R31.0]  Bladder stone [N21.0]    Post-op Diagnosis:  Post-Op Diagnosis Codes:     * Retained ureteral stent [Z96.0]     * Gross hematuria [R31.0]     * Bladder stone [N21.0]    Procedure(s) (LRB):  CYSTOLITHOLAPAXY (N/A)    Anesthesia: General    Description of the findings of the procedure: Large stones on 2 stent fragments in bladder, largest over 6cm. Stones fragmented using cyberwand through nephroscope. Once stents cleared of fragments, stent fragments were removed with grasper under fluoroscopy. Stone fragments removed with eelik. Bladder stone free upon completion.    Estimated Blood Loss: 0cc         Specimens:   Specimen (12h ago, onward)    None          Discharge Note    SUMMARY     Admit Date: 9/8/2020    Discharge Date and Time: 9/8/2020    Hospital Course: Patient was admitted for elective outpatient procedure, which was uncomplicated and well tolerated.      Final Diagnosis: Post-Op Diagnosis Codes:     * Retained ureteral stent [Z96.0]     * Gross hematuria [R31.0]     * Bladder stone [N21.0]    Disposition: Home or Self Care    Follow Up/Patient Instructions:     Medications:  Reconciled Home Medications:   Current Discharge Medication List      START taking these medications    Details   oxybutynin (DITROPAN) 5 MG Tab Take 1 tablet (5 mg total) by mouth 3 (three) times daily as needed (bladder spasm).  Qty: 30 tablet, Refills: 0      phenazopyridine (PYRIDIUM) 100 MG tablet Take 2 tablets (200 mg total) by mouth every 8 (eight) hours as needed (burning with urination).  Qty: 20 tablet, Refills: 0         CONTINUE these medications which have NOT CHANGED    Details   ALPRAZolam (XANAX) 1 MG tablet       buPROPion (WELLBUTRIN XL) 150 MG TB24 tablet Take 150 mg  by mouth once daily.      sulfamethoxazole-trimethoprim 800-160mg (BACTRIM DS) 800-160 mg Tab Take 1 tablet by mouth 2 (two) times daily. for 7 days  Qty: 14 tablet, Refills: 0      traZODone (DESYREL) 150 MG tablet            Discharge Procedure Orders   Diet general     Follow-up Information     Caleb Viera MD.    Specialty: Urology  Why: Office will call to schedule appointment  Contact information:  [address] [id number] [phone]

## 2020-09-09 ENCOUNTER — TELEPHONE (OUTPATIENT)
Dept: UROLOGY | Facility: CLINIC | Age: 55
End: 2020-09-09

## 2020-09-09 NOTE — TELEPHONE ENCOUNTER
----- Message from Janneth Powell sent at 9/9/2020  9:44 AM CDT -----  Type: Patient Call Back    Who called: Self   What is the request in detail: patient states she had surgery on yesterday and need a work excuse for her son (BRAYAN Patel Jr. )who was with her. Please call     Can the clinic reply by [facility]? No     Would the patient rather a call back or a response via [facility]?  Call     Best call back number:[phone] (home)

## 2020-09-09 NOTE — TELEPHONE ENCOUNTER
Spoke to the patient and asked her to check to see of her son signed her out after surgery and if so she can use that paper work.

## 2020-09-10 LAB
FINAL PATHOLOGIC DIAGNOSIS: NORMAL
GROSS: NORMAL

## 2020-09-14 ENCOUNTER — TELEPHONE (OUTPATIENT)
Dept: UROLOGY | Facility: CLINIC | Age: 55
End: 2020-09-14

## 2020-09-14 NOTE — TELEPHONE ENCOUNTER
LVM to schedule appt per MD.    ----- Message from Caleb Viera MD sent at 9/14/2020 10:35 AM CDT -----  This patient needs an appointment this week with Dr. Ross to discuss her nephrectomy. He said he could do a virtual visit if she prefers. Thanks.

## 2020-09-15 ENCOUNTER — TELEPHONE (OUTPATIENT)
Dept: UROLOGY | Facility: CLINIC | Age: 55
End: 2020-09-15

## 2020-09-15 NOTE — TELEPHONE ENCOUNTER
Patient's phone does not support [place] and patient is unable to come in person due to weather. VV switched to audio only.  ----- Message from Alo Powell sent at 9/15/2020  9:06 AM CDT -----  Type:  Patient Returning Call    Who Called: MATA GODINEZ [[id number]]    Who Left Message for Patient: Anais     Does the patient know what this is regarding?:yes     Best Call Back Number: [phone] (home)      Additional Information:

## 2020-09-15 NOTE — TELEPHONE ENCOUNTER
----- Message from Yaron Ross MD sent at 9/15/2020  1:01 PM CDT -----  I called the patient and there was no answer.  Can we please schedule her for a are you only visit at 10:00 a.m. tomorrow?    Thanks    Sc

## 2020-09-15 NOTE — TELEPHONE ENCOUNTER
----- Message from Cherelle Mcintosh sent at 9/15/2020  1:26 PM CDT -----  I opened the spot for this  ----- Message -----  From: Laxmi Zamorano MA  Sent: 9/15/2020   1:23 PM CDT  To: Cherelle Mcintosh    GOOD AFTERNOON PER DR. KAISER HAVE THE PATIENT PLACED ON HIS SCHEDULE FOR 09/16/20 AT 10:00AM.THANK YOU   ----- Message -----  From: Yaron Kaiser MD  Sent: 9/15/2020   1:01 PM CDT  To: Cody Marrufo Staff    I called the patient and there was no answer.  Can we please schedule her for a are you only visit at 10:00 a.m. tomorrow?    Thanks    Sc

## 2020-09-16 ENCOUNTER — OFFICE VISIT (OUTPATIENT)
Dept: UROLOGY | Facility: CLINIC | Age: 55
End: 2020-09-16
Payer: COMMERCIAL

## 2020-09-16 DIAGNOSIS — Z96.0 RETAINED URETERAL STENT: ICD-10-CM

## 2020-09-16 DIAGNOSIS — N26.1 ATROPHY OF KIDNEY (TERMINAL): Primary | ICD-10-CM

## 2020-09-16 PROCEDURE — 99442 PR PHYSICIAN TELEPHONE EVALUATION 11-20 MIN: CPT | Mod: 95,S$GLB,, | Performed by: UROLOGY

## 2020-09-16 PROCEDURE — 99442 PR PHYSICIAN TELEPHONE EVALUATION 11-20 MIN: ICD-10-PCS | Mod: 95,S$GLB,, | Performed by: UROLOGY

## 2020-09-16 NOTE — H&P (VIEW-ONLY)
Subjective:      Tamara Frausto is a 55 y.o. female who returns today regarding her     Audio Only Telehealth Visit     The patient location is: home  The chief complaint leading to consultation is: preop for right nephrectomy  Visit type: Virtual visit with audio only (telephone)  Total time spent with patient: 15 min     The reason for the audio only service rather than synchronous audio and video virtual visit was related to technical difficulties or patient preference/necessity.     Each patient to whom I provide medical services by telemedicine is:  (1) informed of the relationship between the physician and patient and the respective role of any other health care provider with respect to management of the patient; and (2) notified that they may decline to receive medical services by telemedicine and may withdraw from such care at any time. Patient verbally consented to receive this service via voice-only telephone call.       HPI:  55WF with atrophic right kidney and retained stent.  Dr Viera removed distal coil and stone cystoscopically.  Now presents for preop for right nephrectomy       Assessment and plan:      See below                     This service was not originating from a related E/M service provided within the previous 7 days nor will  to an E/M service or procedure within the next 24 hours or my soonest available appointment.  Prevailing standard of care was able to be met in this audio-only visit.    .    The following portions of the patient's history were reviewed and updated as appropriate: allergies, current medications, past family history, past medical history, past social history, past surgical history and problem list.    Review of Systems  Pertinent items are noted in HPI.  A comprehensive multipoint review of systems was negative except as otherwise stated in the HPI.    Past Medical History:   Diagnosis Date    Anemia     Cancer     melanoma left shoulder    Disorder  of kidney and ureter     Kidney stone      Past Surgical History:   Procedure Laterality Date     SECTION      CYSTOSCOPIC LITHOLAPAXY N/A 2020    Procedure: CYSTOLITHOLAPAXY;  Surgeon: Caleb Viera MD;  Location: [facility];  Service: Urology;  Laterality: N/A;  Will need nephroscope and cyberwand.    CYSTOSCOPY      insertion of stent    SKIN CANCER EXCISION         Review of patient's allergies indicates:   Allergen Reactions    Hydrocodone Other (See Comments)     Extremely hyper and agitated          Objective:   Vitals: There were no vitals taken for this visit.    Physical Exam   All counseling    Physical Exam    Lab Review   Urinalysis demonstrates no specimen    No results found for: WBC, HGB, HCT, MCV, PLT  No results found for: CREATININE, BUN    Imaging  CT large right renal stone with calcified stent and atrophied parenchyma  Single artery and vein    Renal scan 7% function on right    Assessment and Plan:   Atrophy of kidney (terminal)  -     Case Request Operating Room: XI ROBOTIC NEPHRECTOMY    Retained ureteral stent  -     Case Request Operating Room: XI ROBOTIC NEPHRECTOMY    Other orders  -     Progressive Mobility Protocol (mobilize patient to their highest level of functioning at least twice daily); Standing  -     Full code; Standing      Robotic right nephrectomy 2020      Discussed all surgical approaches.  Discuss that kidney does not appear salvagable with only 7% functions.  Answered all questions.  Will do consents in morning of procedure and have pt see anesthesia that morning as she is coming from [place]

## 2020-09-16 NOTE — PROGRESS NOTES
Subjective:      Tamara Frausto is a 55 y.o. female who returns today regarding her     Audio Only Telehealth Visit     The patient location is: home  The chief complaint leading to consultation is: preop for right nephrectomy  Visit type: Virtual visit with audio only (telephone)  Total time spent with patient: 15 min     The reason for the audio only service rather than synchronous audio and video virtual visit was related to technical difficulties or patient preference/necessity.     Each patient to whom I provide medical services by telemedicine is:  (1) informed of the relationship between the physician and patient and the respective role of any other health care provider with respect to management of the patient; and (2) notified that they may decline to receive medical services by telemedicine and may withdraw from such care at any time. Patient verbally consented to receive this service via voice-only telephone call.       HPI:  55WF with atrophic right kidney and retained stent.  Dr Viera removed distal coil and stone cystoscopically.  Now presents for preop for right nephrectomy       Assessment and plan:      See below                     This service was not originating from a related E/M service provided within the previous 7 days nor will  to an E/M service or procedure within the next 24 hours or my soonest available appointment.  Prevailing standard of care was able to be met in this audio-only visit.    .    The following portions of the patient's history were reviewed and updated as appropriate: allergies, current medications, past family history, past medical history, past social history, past surgical history and problem list.    Review of Systems  Pertinent items are noted in HPI.  A comprehensive multipoint review of systems was negative except as otherwise stated in the HPI.    Past Medical History:   Diagnosis Date    Anemia     Cancer     melanoma left shoulder    Disorder  of kidney and ureter     Kidney stone      Past Surgical History:   Procedure Laterality Date     SECTION      CYSTOSCOPIC LITHOLAPAXY N/A 2020    Procedure: CYSTOLITHOLAPAXY;  Surgeon: Caleb Viera MD;  Location: [facility];  Service: Urology;  Laterality: N/A;  Will need nephroscope and cyberwand.    CYSTOSCOPY      insertion of stent    SKIN CANCER EXCISION         Review of patient's allergies indicates:   Allergen Reactions    Hydrocodone Other (See Comments)     Extremely hyper and agitated          Objective:   Vitals: There were no vitals taken for this visit.    Physical Exam   All counseling    Physical Exam    Lab Review   Urinalysis demonstrates no specimen    No results found for: WBC, HGB, HCT, MCV, PLT  No results found for: CREATININE, BUN    Imaging  CT large right renal stone with calcified stent and atrophied parenchyma  Single artery and vein    Renal scan 7% function on right    Assessment and Plan:   Atrophy of kidney (terminal)  -     Case Request Operating Room: XI ROBOTIC NEPHRECTOMY    Retained ureteral stent  -     Case Request Operating Room: XI ROBOTIC NEPHRECTOMY    Other orders  -     Progressive Mobility Protocol (mobilize patient to their highest level of functioning at least twice daily); Standing  -     Full code; Standing      Robotic right nephrectomy 2020      Discussed all surgical approaches.  Discuss that kidney does not appear salvagable with only 7% functions.  Answered all questions.  Will do consents in morning of procedure and have pt see anesthesia that morning as she is coming from [place]

## 2020-09-28 ENCOUNTER — HOSPITAL ENCOUNTER (OUTPATIENT)
Dept: PREADMISSION TESTING | Facility: OTHER | Age: 55
Discharge: HOME | End: 2020-09-28
Payer: COMMERCIAL

## 2020-09-28 ENCOUNTER — ANESTHESIA EVENT (OUTPATIENT)
Dept: SURGERY | Facility: OTHER | Age: 55
DRG: 661 | End: 2020-09-28
Payer: COMMERCIAL

## 2020-09-28 ENCOUNTER — TELEPHONE (OUTPATIENT)
Dept: UROLOGY | Facility: CLINIC | Age: 55
End: 2020-09-28

## 2020-09-28 ENCOUNTER — LAB VISIT (OUTPATIENT)
Dept: PRIMARY CARE CLINIC | Facility: CLINIC | Age: 55
End: 2020-09-28
Payer: COMMERCIAL

## 2020-09-28 VITALS
HEIGHT: 64 IN | WEIGHT: 143 LBS | HEART RATE: 77 BPM | DIASTOLIC BLOOD PRESSURE: 61 MMHG | SYSTOLIC BLOOD PRESSURE: 109 MMHG | TEMPERATURE: 97 F | RESPIRATION RATE: 16 BRPM | BODY MASS INDEX: 24.41 KG/M2 | OXYGEN SATURATION: 94 %

## 2020-09-28 DIAGNOSIS — N26.1 ATROPHY OF KIDNEY (TERMINAL): ICD-10-CM

## 2020-09-28 DIAGNOSIS — Z01.818 PREOP TESTING: Primary | ICD-10-CM

## 2020-09-28 LAB
ABO + RH BLD: NORMAL
ANION GAP SERPL CALC-SCNC: 11 MMOL/L (ref 8–16)
BASOPHILS # BLD AUTO: 0.09 K/UL (ref 0–0.2)
BASOPHILS NFR BLD: 1.2 % (ref 0–1.9)
BLD GP AB SCN CELLS X3 SERPL QL: NORMAL
BUN SERPL-MCNC: 12 MG/DL (ref 6–20)
CALCIUM SERPL-MCNC: 9.5 MG/DL (ref 8.7–10.5)
CHLORIDE SERPL-SCNC: 103 MMOL/L (ref 95–110)
CO2 SERPL-SCNC: 24 MMOL/L (ref 23–29)
CREAT SERPL-MCNC: 1 MG/DL (ref 0.5–1.4)
DIFFERENTIAL METHOD: NORMAL
EOSINOPHIL # BLD AUTO: 0.2 K/UL (ref 0–0.5)
EOSINOPHIL NFR BLD: 1.9 % (ref 0–8)
ERYTHROCYTE [DISTWIDTH] IN BLOOD BY AUTOMATED COUNT: 12.4 % (ref 11.5–14.5)
EST. GFR  (AFRICAN AMERICAN): >60 ML/MIN/1.73 M^2
EST. GFR  (NON AFRICAN AMERICAN): >60 ML/MIN/1.73 M^2
GLUCOSE SERPL-MCNC: 104 MG/DL (ref 70–110)
HCT VFR BLD AUTO: 47.8 % (ref 37–48.5)
HGB BLD-MCNC: 15.6 G/DL (ref 12–16)
IMM GRANULOCYTES # BLD AUTO: 0.03 K/UL (ref 0–0.04)
IMM GRANULOCYTES NFR BLD AUTO: 0.4 % (ref 0–0.5)
LYMPHOCYTES # BLD AUTO: 2.5 K/UL (ref 1–4.8)
LYMPHOCYTES NFR BLD: 32.7 % (ref 18–48)
MCH RBC QN AUTO: 30.7 PG (ref 27–31)
MCHC RBC AUTO-ENTMCNC: 32.6 G/DL (ref 32–36)
MCV RBC AUTO: 94 FL (ref 82–98)
MONOCYTES # BLD AUTO: 0.4 K/UL (ref 0.3–1)
MONOCYTES NFR BLD: 5.3 % (ref 4–15)
NEUTROPHILS # BLD AUTO: 4.5 K/UL (ref 1.8–7.7)
NEUTROPHILS NFR BLD: 58.5 % (ref 38–73)
NRBC BLD-RTO: 0 /100 WBC
PLATELET # BLD AUTO: 268 K/UL (ref 150–350)
PMV BLD AUTO: 9.6 FL (ref 9.2–12.9)
POTASSIUM SERPL-SCNC: 4.4 MMOL/L (ref 3.5–5.1)
RBC # BLD AUTO: 5.08 M/UL (ref 4–5.4)
SODIUM SERPL-SCNC: 138 MMOL/L (ref 136–145)
WBC # BLD AUTO: 7.73 K/UL (ref 3.9–12.7)

## 2020-09-28 PROCEDURE — 93010 EKG 12-LEAD: ICD-10-PCS | Mod: ,,, | Performed by: INTERNAL MEDICINE

## 2020-09-28 PROCEDURE — U0003 INFECTIOUS AGENT DETECTION BY NUCLEIC ACID (DNA OR RNA); SEVERE ACUTE RESPIRATORY SYNDROME CORONAVIRUS 2 (SARS-COV-2) (CORONAVIRUS DISEASE [COVID-19]), AMPLIFIED PROBE TECHNIQUE, MAKING USE OF HIGH THROUGHPUT TECHNOLOGIES AS DESCRIBED BY CMS-2020-01-R: HCPCS

## 2020-09-28 PROCEDURE — 87186 SC STD MICRODIL/AGAR DIL: CPT

## 2020-09-28 PROCEDURE — 93005 ELECTROCARDIOGRAM TRACING: CPT

## 2020-09-28 PROCEDURE — 85025 COMPLETE CBC W/AUTO DIFF WBC: CPT

## 2020-09-28 PROCEDURE — 36415 COLL VENOUS BLD VENIPUNCTURE: CPT

## 2020-09-28 PROCEDURE — 80048 BASIC METABOLIC PNL TOTAL CA: CPT

## 2020-09-28 PROCEDURE — 87088 URINE BACTERIA CULTURE: CPT

## 2020-09-28 PROCEDURE — 93010 ELECTROCARDIOGRAM REPORT: CPT | Mod: ,,, | Performed by: INTERNAL MEDICINE

## 2020-09-28 PROCEDURE — 87077 CULTURE AEROBIC IDENTIFY: CPT

## 2020-09-28 PROCEDURE — 87086 URINE CULTURE/COLONY COUNT: CPT

## 2020-09-28 PROCEDURE — 86901 BLOOD TYPING SEROLOGIC RH(D): CPT

## 2020-09-28 RX ORDER — LIDOCAINE HYDROCHLORIDE 10 MG/ML
0.5 INJECTION, SOLUTION EPIDURAL; INFILTRATION; INTRACAUDAL; PERINEURAL ONCE
Status: CANCELLED | OUTPATIENT
Start: 2020-09-28 | End: 2020-09-28

## 2020-09-28 RX ORDER — SODIUM CHLORIDE, SODIUM LACTATE, POTASSIUM CHLORIDE, CALCIUM CHLORIDE 600; 310; 30; 20 MG/100ML; MG/100ML; MG/100ML; MG/100ML
INJECTION, SOLUTION INTRAVENOUS CONTINUOUS
Status: CANCELLED | OUTPATIENT
Start: 2020-09-28

## 2020-09-28 RX ORDER — PREGABALIN 50 MG/1
50 CAPSULE ORAL
Status: CANCELLED | OUTPATIENT
Start: 2020-09-28 | End: 2020-09-28

## 2020-09-28 RX ORDER — ACETAMINOPHEN 500 MG
1000 TABLET ORAL
Status: CANCELLED | OUTPATIENT
Start: 2020-09-28 | End: 2020-09-28

## 2020-09-28 RX ORDER — FAMOTIDINE 20 MG/1
20 TABLET, FILM COATED ORAL
Status: CANCELLED | OUTPATIENT
Start: 2020-09-28 | End: 2020-09-28

## 2020-09-28 NOTE — ANESTHESIA PREPROCEDURE EVALUATION
09/28/2020  Tamara Frausto is a 55 y.o., female.    Anesthesia Evaluation    I have reviewed the Patient Summary Reports.    I have reviewed the Nursing Notes.    I have reviewed the Medications.     Review of Systems  Anesthesia Hx:  No problems with previous Anesthesia  Denies Family Hx of Anesthesia complications.   Denies Personal Hx of Anesthesia complications.   Social:  Smoker, Social Alcohol Use    Hematology/Oncology:  Hematology Normal   Oncology Normal   Oncology Comments: Left shoulder skin melanoma     EENT/Dental:EENT/Dental Normal   Cardiovascular:  Cardiovascular Normal     Pulmonary:  Pulmonary Normal    Renal/:   Chronic Renal Disease Retained stent   Hepatic/GI:  Hepatic/GI Normal    Musculoskeletal:  Musculoskeletal Normal    Neurological:  Neurology Normal    Endocrine:  Endocrine Normal    Dermatological:  Skin Normal    Psych:  Psychiatric Normal           Physical Exam  General:  Well nourished    Airway/Jaw/Neck:  Airway Findings: Mouth Opening: Normal Tongue: Normal  General Airway Assessment: Adult, Good  Mallampati: II      Dental:  Dental Findings: In tact, Upper Dentures        Mental Status:  Mental Status Findings:  Cooperative, Alert and Oriented         Anesthesia Plan  Type of Anesthesia, risks & benefits discussed:  Anesthesia Type:  general  Patient's Preference:   Intra-op Monitoring Plan: standard ASA monitors  Intra-op Monitoring Plan Comments:   Post Op Pain Control Plan: multimodal analgesia  Post Op Pain Control Plan Comments:   Induction:   IV  Beta Blocker:         Informed Consent: Patient understands risks and agrees with Anesthesia plan.  Questions answered. Anesthesia consent signed with patient.  ASA Score: 2     Day of Surgery Review of History & Physical:    H&P update referred to the surgeon.     Anesthesia Plan Notes: Pt requests devices on right  due to axillary node issues on Left during melanoma surgery. Labs per surgeon.        Ready For Surgery From Anesthesia Perspective.

## 2020-09-28 NOTE — DISCHARGE INSTRUCTIONS
Information to Prepare you for your Surgery    PRE-ADMIT TESTING -  [phone]    [address]  [facility]          Your surgery has been scheduled at [facility]. We are pleased to have the opportunity to serve you. For Further Information please call [phone].    On the day of surgery please report to the Information Desk on the 1st floor.    · CONTACT YOUR PHYSICIAN'S OFFICE THE DAY PRIOR TO YOUR SURGERY TO OBTAIN YOUR ARRIVAL TIME.     · The evening before surgery do not eat anything after 9 p.m. ( this includes hard candy, chewing gum and mints).  You may only have GATORADE, POWERADE AND WATER  from 9 p.m. until you leave your home.   DO NOT DRINK ANY LIQUIDS ON THE WAY TO THE HOSPITAL.      SPECIAL MEDICATION INSTRUCTIONS: TAKE medications checked off by the Anesthesiologist on your Medication List.    Angiogram Patients: Take medications as instructed by your physician, including aspirin.     Surgery Patients:    If you take ASPIRIN - Your PHYSICIAN/SURGEON will need to inform you IF/OR when you need to stop taking aspirin prior to your surgery.     Do Not take any medications containing IBUPROFEN.  Do Not Wear any make-up or dark nail polish   (especially eye make-up) to surgery. If you come to surgery with makeup on you will be required to remove the makeup or nail polish.    Do not shave your surgical area at least 5 days prior to your surgery. The surgical prep will be performed at the hospital according to Infection Control regulations.    Leave all valuables at home.   Do Not wear any jewelry or watches, including any metal in body piercings. Jewelry must be removed prior to coming to the hospital.  There is a possibility that rings that are unable to be removed may be cut off if they are on the surgical extremity.    Contact Lens must be removed before surgery. Either do not wear the contact lens or bring a case and solution for  storage.  Please bring a container for eyeglasses or dentures as required.  Bring any paperwork your physician has provided, such as consent forms,  history and physicals, doctor's orders, etc.   Bring comfortable clothes that are loose fitting to wear upon discharge. Take into consideration the type of surgery being performed.  Maintain your diet as advised per your physician the day prior to surgery.      Adequate rest the night before surgery is advised.   Park in the Parking lot behind the hospital or in the [place] [facility] across the street from the parking lot. Parking is complimentary.  If you will be discharged the same day as your procedure, please arrange for a responsible adult to drive you home or to accompany you if traveling by taxi.   YOU WILL NOT BE PERMITTED TO DRIVE OR TO LEAVE THE HOSPITAL ALONE AFTER SURGERY.   If you are being discharged the same day, it is strongly recommended that you arrange for someone to remain with you for the first 24 hrs following your surgery.    The Surgeon will speak to your family/visitor after your surgery regarding the outcome of your surgery and post op care.  The Surgeon may speak to you after your surgery, but there is a possibility you may not remember the details.  Please check with your family members regarding the conversation with the Surgeon.    We strongly recommend whoever is bringing you home be present for discharge instructions.  This will ensure a thorough understanding for your post op home care.    ALL CHILDREN MUST ALWAYS BE ACCOMPANIED BY AN ADULT.    Visitors-Refer to current Visitor policy handouts.    Thank you for your cooperation.  The Staff of [facility].                Bathing Instructions with Hibiclens     Shower the evening before and morning of your procedure with Hibiclens:   Wash your face with water and your regular face wash/soap   Apply Hibiclens directly on your skin or on a wet washcloth and wash  gently. When showering: Move away from the shower stream when applying Hibiclens to avoid rinsing off too soon.   Rinse thoroughly with warm water   Do not dilute Hibiclens         Dry off as usual, do not use any deodorant, powder, body lotions, perfume, after shave or cologne.

## 2020-09-28 NOTE — TELEPHONE ENCOUNTER
----- Message from Julissa Vyas RN sent at 9/28/2020 11:02 AM CDT -----  Regarding: urine culture  Pt here fpr pre-op testing,does she need urine culture done today,we do not see order-please let me know -pre -admit -[id number]

## 2020-09-28 NOTE — TELEPHONE ENCOUNTER
----- Message from Julissa Vyas RN sent at 9/28/2020 11:02 AM CDT -----  Regarding: urine culture  Pt here fpr pre-op testing,does she need urine culture done today,we do not see order-please let me know -pre -admit -[id number]

## 2020-09-28 NOTE — PROGRESS NOTES
Dr Ross forwarded these results to the patient via [employer].  He will discuss the results with the patient in person at the next appointment.  The patient was instructed to make an appointment if she does not already have one scheduled.

## 2020-09-29 ENCOUNTER — TELEPHONE (OUTPATIENT)
Dept: UROLOGY | Facility: CLINIC | Age: 55
End: 2020-09-29

## 2020-09-29 LAB — SARS-COV-2 RNA RESP QL NAA+PROBE: NOT DETECTED

## 2020-09-29 NOTE — TELEPHONE ENCOUNTER
----- Message from Regina Corbin sent at 9/29/2020  8:28 AM CDT -----  Regarding: Patient call back  Who called:MATA GODINEZ [[id number]]    What is the request in detail: Patient is requesting a call back. She states it is regarding her procedure tomorrow. She would lie to further discuss.   Please advise.    Can the clinic reply by [facility]? No    Best call back number: [phone]    Additional Information: N/A

## 2020-09-29 NOTE — TELEPHONE ENCOUNTER
Spoke to the patient and informed her that the disc is in the doctors hand for review through out the surgery.Patient was very grateful and also informed the patient to do the prep and I will contact her with the arrival time.

## 2020-09-29 NOTE — TELEPHONE ENCOUNTER
Spoke to patient- we have disc.    ----- Message from Yaron Ross MD sent at 9/29/2020  7:31 AM CDT -----  Please see if we have this patient's outside CT scans.  They are on a disc.  If not, please contact her and have her get a copy of the disc and bring it on at the time of her surgery on Wednesday     Thanks     Sc

## 2020-09-29 NOTE — PROGRESS NOTES
Dr Ross forwarded these results to the patient via [employer].  He will discuss the results with the patient in person at the next appointment.  The patient was instructed to make an appointment if she does not already have one scheduled.

## 2020-09-30 ENCOUNTER — ANESTHESIA (OUTPATIENT)
Dept: SURGERY | Facility: OTHER | Age: 55
DRG: 661 | End: 2020-09-30
Payer: COMMERCIAL

## 2020-09-30 ENCOUNTER — HOSPITAL ENCOUNTER (INPATIENT)
Facility: OTHER | Age: 55
LOS: 1 days | Discharge: HOME | DRG: 661 | End: 2020-10-01
Attending: UROLOGY | Admitting: UROLOGY
Payer: COMMERCIAL

## 2020-09-30 DIAGNOSIS — N26.1 ATROPHY OF KIDNEY (TERMINAL): Primary | ICD-10-CM

## 2020-09-30 DIAGNOSIS — Z96.0 RETAINED URETERAL STENT: ICD-10-CM

## 2020-09-30 PROBLEM — N21.0 BLADDER STONE: Status: RESOLVED | Noted: 2020-09-08 | Resolved: 2020-09-30

## 2020-09-30 LAB
BACTERIA UR CULT: ABNORMAL
BLD PROD TYP BPU: NORMAL
BLD PROD TYP BPU: NORMAL
BLOOD UNIT EXPIRATION DATE: NORMAL
BLOOD UNIT EXPIRATION DATE: NORMAL
BLOOD UNIT TYPE CODE: 6200
BLOOD UNIT TYPE CODE: 6200
BLOOD UNIT TYPE: NORMAL
BLOOD UNIT TYPE: NORMAL
CODING SYSTEM: NORMAL
CODING SYSTEM: NORMAL
DISPENSE STATUS: NORMAL
DISPENSE STATUS: NORMAL
TRANS ERYTHROCYTES VOL PATIENT: NORMAL ML
TRANS ERYTHROCYTES VOL PATIENT: NORMAL ML

## 2020-09-30 PROCEDURE — 25000003 PHARM REV CODE 250: Performed by: SPECIALIST

## 2020-09-30 PROCEDURE — 36000712 HC OR TIME LEV V 1ST 15 MIN: Performed by: UROLOGY

## 2020-09-30 PROCEDURE — 27201423 OPTIME MED/SURG SUP & DEVICES STERILE SUPPLY: Performed by: UROLOGY

## 2020-09-30 PROCEDURE — 71000039 HC RECOVERY, EACH ADD'L HOUR: Performed by: UROLOGY

## 2020-09-30 PROCEDURE — 25000003 PHARM REV CODE 250: Performed by: ANESTHESIOLOGY

## 2020-09-30 PROCEDURE — 25000003 PHARM REV CODE 250: Performed by: NURSE ANESTHETIST, CERTIFIED REGISTERED

## 2020-09-30 PROCEDURE — C9290 INJ, BUPIVACAINE LIPOSOME: HCPCS | Performed by: UROLOGY

## 2020-09-30 PROCEDURE — 50546 PR NEPHRECTOMY: ICD-10-PCS | Mod: RT,,, | Performed by: UROLOGY

## 2020-09-30 PROCEDURE — 88307 TISSUE EXAM BY PATHOLOGIST: CPT | Performed by: PATHOLOGY

## 2020-09-30 PROCEDURE — 11000001 HC ACUTE MED/SURG PRIVATE ROOM

## 2020-09-30 PROCEDURE — 50546 LAPAROSCOPIC NEPHRECTOMY: CPT | Mod: RT,,, | Performed by: UROLOGY

## 2020-09-30 PROCEDURE — 63600175 PHARM REV CODE 636 W HCPCS: Performed by: UROLOGY

## 2020-09-30 PROCEDURE — 63600175 PHARM REV CODE 636 W HCPCS: Mod: JG | Performed by: NURSE ANESTHETIST, CERTIFIED REGISTERED

## 2020-09-30 PROCEDURE — 37000009 HC ANESTHESIA EA ADD 15 MINS: Performed by: UROLOGY

## 2020-09-30 PROCEDURE — 94761 N-INVAS EAR/PLS OXIMETRY MLT: CPT

## 2020-09-30 PROCEDURE — 25000003 PHARM REV CODE 250: Performed by: STUDENT IN AN ORGANIZED HEALTH CARE EDUCATION/TRAINING PROGRAM

## 2020-09-30 PROCEDURE — 37000008 HC ANESTHESIA 1ST 15 MINUTES: Performed by: UROLOGY

## 2020-09-30 PROCEDURE — 27000221 HC OXYGEN, UP TO 24 HOURS

## 2020-09-30 PROCEDURE — 71000033 HC RECOVERY, INTIAL HOUR: Performed by: UROLOGY

## 2020-09-30 PROCEDURE — 94799 UNLISTED PULMONARY SVC/PX: CPT

## 2020-09-30 PROCEDURE — 63600175 PHARM REV CODE 636 W HCPCS: Performed by: STUDENT IN AN ORGANIZED HEALTH CARE EDUCATION/TRAINING PROGRAM

## 2020-09-30 PROCEDURE — 63600175 PHARM REV CODE 636 W HCPCS: Performed by: ANESTHESIOLOGY

## 2020-09-30 PROCEDURE — 63600175 PHARM REV CODE 636 W HCPCS: Performed by: NURSE ANESTHETIST, CERTIFIED REGISTERED

## 2020-09-30 PROCEDURE — P9045 ALBUMIN (HUMAN), 5%, 250 ML: HCPCS | Mod: JG | Performed by: NURSE ANESTHETIST, CERTIFIED REGISTERED

## 2020-09-30 PROCEDURE — 88307 TISSUE EXAM BY PATHOLOGIST: CPT | Mod: 26,,, | Performed by: PATHOLOGY

## 2020-09-30 PROCEDURE — 88307 PR  SURG PATH,LEVEL V: ICD-10-PCS | Mod: 26,,, | Performed by: PATHOLOGY

## 2020-09-30 PROCEDURE — 36000713 HC OR TIME LEV V EA ADD 15 MIN: Performed by: UROLOGY

## 2020-09-30 PROCEDURE — 86920 COMPATIBILITY TEST SPIN: CPT

## 2020-09-30 RX ORDER — NEOSTIGMINE METHYLSULFATE 1 MG/ML
INJECTION, SOLUTION INTRAVENOUS
Status: DISCONTINUED | OUTPATIENT
Start: 2020-09-30 | End: 2020-09-30

## 2020-09-30 RX ORDER — SODIUM CHLORIDE, SODIUM LACTATE, POTASSIUM CHLORIDE, CALCIUM CHLORIDE 600; 310; 30; 20 MG/100ML; MG/100ML; MG/100ML; MG/100ML
INJECTION, SOLUTION INTRAVENOUS CONTINUOUS PRN
Status: DISCONTINUED | OUTPATIENT
Start: 2020-09-30 | End: 2020-09-30

## 2020-09-30 RX ORDER — SODIUM CHLORIDE, SODIUM LACTATE, POTASSIUM CHLORIDE, CALCIUM CHLORIDE 600; 310; 30; 20 MG/100ML; MG/100ML; MG/100ML; MG/100ML
INJECTION, SOLUTION INTRAVENOUS CONTINUOUS
Status: DISCONTINUED | OUTPATIENT
Start: 2020-09-30 | End: 2020-10-01

## 2020-09-30 RX ORDER — LIDOCAINE HYDROCHLORIDE 10 MG/ML
0.5 INJECTION, SOLUTION EPIDURAL; INFILTRATION; INTRACAUDAL; PERINEURAL ONCE
Status: DISCONTINUED | OUTPATIENT
Start: 2020-09-30 | End: 2020-10-01 | Stop reason: HOSPADM

## 2020-09-30 RX ORDER — MEPERIDINE HYDROCHLORIDE 25 MG/ML
12.5 INJECTION INTRAMUSCULAR; INTRAVENOUS; SUBCUTANEOUS ONCE AS NEEDED
Status: DISCONTINUED | OUTPATIENT
Start: 2020-09-30 | End: 2020-09-30 | Stop reason: HOSPADM

## 2020-09-30 RX ORDER — OXYBUTYNIN CHLORIDE 5 MG/1
5 TABLET ORAL 3 TIMES DAILY PRN
Status: DISCONTINUED | OUTPATIENT
Start: 2020-09-30 | End: 2020-10-01 | Stop reason: HOSPADM

## 2020-09-30 RX ORDER — ACETAMINOPHEN 500 MG
1000 TABLET ORAL
Status: DISCONTINUED | OUTPATIENT
Start: 2020-09-30 | End: 2020-10-01 | Stop reason: HOSPADM

## 2020-09-30 RX ORDER — HYDROCODONE BITARTRATE AND ACETAMINOPHEN 500; 5 MG/1; MG/1
TABLET ORAL
Status: DISCONTINUED | OUTPATIENT
Start: 2020-09-30 | End: 2020-10-01 | Stop reason: HOSPADM

## 2020-09-30 RX ORDER — HYDROMORPHONE HYDROCHLORIDE 2 MG/ML
0.4 INJECTION, SOLUTION INTRAMUSCULAR; INTRAVENOUS; SUBCUTANEOUS EVERY 5 MIN PRN
Status: DISCONTINUED | OUTPATIENT
Start: 2020-09-30 | End: 2020-09-30 | Stop reason: HOSPADM

## 2020-09-30 RX ORDER — PROPOFOL 10 MG/ML
VIAL (ML) INTRAVENOUS
Status: DISCONTINUED | OUTPATIENT
Start: 2020-09-30 | End: 2020-09-30

## 2020-09-30 RX ORDER — OXYCODONE HYDROCHLORIDE 5 MG/1
5 TABLET ORAL
Status: DISCONTINUED | OUTPATIENT
Start: 2020-09-30 | End: 2020-09-30 | Stop reason: HOSPADM

## 2020-09-30 RX ORDER — CEFAZOLIN SODIUM 2 G/50ML
2 SOLUTION INTRAVENOUS
Status: DISCONTINUED | OUTPATIENT
Start: 2020-09-30 | End: 2020-10-01

## 2020-09-30 RX ORDER — ONDANSETRON 2 MG/ML
INJECTION INTRAMUSCULAR; INTRAVENOUS
Status: DISCONTINUED | OUTPATIENT
Start: 2020-09-30 | End: 2020-09-30

## 2020-09-30 RX ORDER — FENTANYL CITRATE 50 UG/ML
INJECTION, SOLUTION INTRAMUSCULAR; INTRAVENOUS
Status: DISCONTINUED | OUTPATIENT
Start: 2020-09-30 | End: 2020-09-30

## 2020-09-30 RX ORDER — ONDANSETRON 2 MG/ML
4 INJECTION INTRAMUSCULAR; INTRAVENOUS DAILY PRN
Status: DISCONTINUED | OUTPATIENT
Start: 2020-09-30 | End: 2020-09-30 | Stop reason: HOSPADM

## 2020-09-30 RX ORDER — FAMOTIDINE 20 MG/1
20 TABLET, FILM COATED ORAL
Status: COMPLETED | OUTPATIENT
Start: 2020-09-30 | End: 2020-09-30

## 2020-09-30 RX ORDER — KETOROLAC TROMETHAMINE 30 MG/ML
15 INJECTION, SOLUTION INTRAMUSCULAR; INTRAVENOUS EVERY 6 HOURS
Status: DISCONTINUED | OUTPATIENT
Start: 2020-09-30 | End: 2020-10-01 | Stop reason: HOSPADM

## 2020-09-30 RX ORDER — ONDANSETRON 2 MG/ML
8 INJECTION INTRAMUSCULAR; INTRAVENOUS EVERY 6 HOURS PRN
Status: DISCONTINUED | OUTPATIENT
Start: 2020-09-30 | End: 2020-10-01 | Stop reason: HOSPADM

## 2020-09-30 RX ORDER — EPHEDRINE SULFATE 50 MG/ML
INJECTION, SOLUTION INTRAVENOUS
Status: DISCONTINUED | OUTPATIENT
Start: 2020-09-30 | End: 2020-09-30

## 2020-09-30 RX ORDER — ALBUMIN HUMAN 50 G/1000ML
SOLUTION INTRAVENOUS CONTINUOUS PRN
Status: DISCONTINUED | OUTPATIENT
Start: 2020-09-30 | End: 2020-09-30

## 2020-09-30 RX ORDER — TRAMADOL HYDROCHLORIDE 50 MG/1
50 TABLET ORAL EVERY 6 HOURS PRN
Status: DISCONTINUED | OUTPATIENT
Start: 2020-09-30 | End: 2020-10-01 | Stop reason: HOSPADM

## 2020-09-30 RX ORDER — MIDAZOLAM HYDROCHLORIDE 1 MG/ML
INJECTION INTRAMUSCULAR; INTRAVENOUS
Status: DISCONTINUED | OUTPATIENT
Start: 2020-09-30 | End: 2020-09-30

## 2020-09-30 RX ORDER — BUPROPION HYDROCHLORIDE 150 MG/1
150 TABLET ORAL DAILY
Status: DISCONTINUED | OUTPATIENT
Start: 2020-10-01 | End: 2020-10-01 | Stop reason: HOSPADM

## 2020-09-30 RX ORDER — SODIUM CHLORIDE 0.9 % (FLUSH) 0.9 %
3 SYRINGE (ML) INJECTION
Status: DISCONTINUED | OUTPATIENT
Start: 2020-09-30 | End: 2020-10-01 | Stop reason: HOSPADM

## 2020-09-30 RX ORDER — ACETAMINOPHEN 500 MG
1000 TABLET ORAL
Status: COMPLETED | OUTPATIENT
Start: 2020-09-30 | End: 2020-09-30

## 2020-09-30 RX ORDER — OXYCODONE HYDROCHLORIDE 5 MG/1
5 TABLET ORAL EVERY 6 HOURS PRN
Status: DISCONTINUED | OUTPATIENT
Start: 2020-09-30 | End: 2020-10-01 | Stop reason: HOSPADM

## 2020-09-30 RX ORDER — LIDOCAINE HCL/PF 100 MG/5ML
SYRINGE (ML) INTRAVENOUS
Status: DISCONTINUED | OUTPATIENT
Start: 2020-09-30 | End: 2020-09-30

## 2020-09-30 RX ORDER — ROCURONIUM BROMIDE 10 MG/ML
INJECTION, SOLUTION INTRAVENOUS
Status: DISCONTINUED | OUTPATIENT
Start: 2020-09-30 | End: 2020-09-30

## 2020-09-30 RX ORDER — DEXAMETHASONE SODIUM PHOSPHATE 4 MG/ML
INJECTION, SOLUTION INTRA-ARTICULAR; INTRALESIONAL; INTRAMUSCULAR; INTRAVENOUS; SOFT TISSUE
Status: DISCONTINUED | OUTPATIENT
Start: 2020-09-30 | End: 2020-09-30

## 2020-09-30 RX ORDER — PREGABALIN 50 MG/1
50 CAPSULE ORAL
Status: COMPLETED | OUTPATIENT
Start: 2020-09-30 | End: 2020-09-30

## 2020-09-30 RX ADMIN — OXYCODONE 5 MG: 5 TABLET ORAL at 08:09

## 2020-09-30 RX ADMIN — ACETAMINOPHEN 1000 MG: 500 TABLET, FILM COATED ORAL at 06:09

## 2020-09-30 RX ADMIN — ROCURONIUM BROMIDE 50 MG: 10 SOLUTION INTRAVENOUS at 10:09

## 2020-09-30 RX ADMIN — ONDANSETRON HYDROCHLORIDE 4 MG: 2 INJECTION INTRAMUSCULAR; INTRAVENOUS at 10:09

## 2020-09-30 RX ADMIN — EPHEDRINE SULFATE 10 MG: 50 INJECTION INTRAVENOUS at 11:09

## 2020-09-30 RX ADMIN — ACETAMINOPHEN 1000 MG: 500 TABLET, FILM COATED ORAL at 09:09

## 2020-09-30 RX ADMIN — HYDROMORPHONE HYDROCHLORIDE 0.4 MG: 2 INJECTION, SOLUTION INTRAMUSCULAR; INTRAVENOUS; SUBCUTANEOUS at 03:09

## 2020-09-30 RX ADMIN — FENTANYL CITRATE 50 MCG: 50 INJECTION, SOLUTION INTRAMUSCULAR; INTRAVENOUS at 01:09

## 2020-09-30 RX ADMIN — FENTANYL CITRATE 100 MCG: 50 INJECTION, SOLUTION INTRAMUSCULAR; INTRAVENOUS at 10:09

## 2020-09-30 RX ADMIN — FENTANYL CITRATE 50 MCG: 50 INJECTION, SOLUTION INTRAMUSCULAR; INTRAVENOUS at 02:09

## 2020-09-30 RX ADMIN — ROCURONIUM BROMIDE 20 MG: 10 SOLUTION INTRAVENOUS at 11:09

## 2020-09-30 RX ADMIN — CEFTRIAXONE 1 G: 1 INJECTION, SOLUTION INTRAVENOUS at 10:09

## 2020-09-30 RX ADMIN — DEXAMETHASONE SODIUM PHOSPHATE 8 MG: 4 INJECTION, SOLUTION INTRAMUSCULAR; INTRAVENOUS at 10:09

## 2020-09-30 RX ADMIN — GLYCOPYRROLATE 0.6 MG: 0.2 INJECTION, SOLUTION INTRAMUSCULAR; INTRAVITREAL at 02:09

## 2020-09-30 RX ADMIN — ALBUMIN (HUMAN): 12.5 SOLUTION INTRAVENOUS at 12:09

## 2020-09-30 RX ADMIN — OXYCODONE 5 MG: 5 TABLET ORAL at 02:09

## 2020-09-30 RX ADMIN — ROCURONIUM BROMIDE 20 MG: 10 SOLUTION INTRAVENOUS at 12:09

## 2020-09-30 RX ADMIN — FAMOTIDINE 20 MG: 20 TABLET ORAL at 09:09

## 2020-09-30 RX ADMIN — ALBUMIN (HUMAN): 12.5 SOLUTION INTRAVENOUS at 11:09

## 2020-09-30 RX ADMIN — MIDAZOLAM HYDROCHLORIDE 2 MG: 1 INJECTION, SOLUTION INTRAMUSCULAR; INTRAVENOUS at 10:09

## 2020-09-30 RX ADMIN — PREGABALIN 50 MG: 50 CAPSULE ORAL at 09:09

## 2020-09-30 RX ADMIN — SODIUM CHLORIDE, SODIUM LACTATE, POTASSIUM CHLORIDE, AND CALCIUM CHLORIDE: .6; .31; .03; .02 INJECTION, SOLUTION INTRAVENOUS at 05:09

## 2020-09-30 RX ADMIN — LIDOCAINE HYDROCHLORIDE 100 MG: 20 INJECTION, SOLUTION INTRAVENOUS at 10:09

## 2020-09-30 RX ADMIN — PROPOFOL 150 MG: 10 INJECTION, EMULSION INTRAVENOUS at 10:09

## 2020-09-30 RX ADMIN — TRAZODONE HYDROCHLORIDE 150 MG: 50 TABLET ORAL at 08:09

## 2020-09-30 RX ADMIN — NEOSTIGMINE METHYLSULFATE 5 MG: 1 INJECTION INTRAVENOUS at 02:09

## 2020-09-30 RX ADMIN — SODIUM CHLORIDE, SODIUM LACTATE, POTASSIUM CHLORIDE, AND CALCIUM CHLORIDE: 600; 310; 30; 20 INJECTION, SOLUTION INTRAVENOUS at 02:09

## 2020-09-30 RX ADMIN — SODIUM CHLORIDE, SODIUM LACTATE, POTASSIUM CHLORIDE, AND CALCIUM CHLORIDE: 600; 310; 30; 20 INJECTION, SOLUTION INTRAVENOUS at 09:09

## 2020-09-30 RX ADMIN — KETOROLAC TROMETHAMINE 15 MG: 30 INJECTION, SOLUTION INTRAMUSCULAR at 06:09

## 2020-09-30 NOTE — ANESTHESIA PROCEDURE NOTES
Intubation  Performed by: Shavonne Akbar CRNA  Authorized by: Josr Figueroa MD     Intubation:     Induction:  Intravenous    Intubated:  Postinduction    Mask Ventilation:  Easy mask    Attempts:  1    Attempted By:  CRNA    Method of Intubation:  Video laryngoscopy    Blade:  Shah 3    Laryngeal View Grade: Grade I - full view of chords      Difficult Airway Encountered?: No      Complications:  None    Airway Device Size:  7.0    Style/Cuff Inflation:  Cuffed    Tube secured:  21    Secured at:  The lips    Placement Verified By:  Capnometry    Complicating Factors:  None    Findings Post-Intubation:  BS equal bilateral and atraumatic/condition of teeth unchanged

## 2020-09-30 NOTE — OP NOTE
[facility]  Operative Note    Date: 09/30/2020    Pre-Op Diagnosis: right atrophic kidney, retained ureteral stent    Post-Op Diagnosis: same    Procedure(s) Performed:   1.  Robotic right total nephrectomy    Specimen(s):   1.  Right kidney    Surgeon: Yaron Ross MD    Assistant: Andrea Diamond MD    Anesthesia: General endotracheal anesthesia    Indications: Tamara Frausto is a 55 y.o. female with a retained right ureteral stent for 18 years.  The stent had fractured and the distal portion of the stent was removed along with cystolitholapaxy which was previously done with Dr. Viera.  Mag 3 renal scan shows a nonfunctional right kidney.  After discussion with the patient about the risks, benefits, and alternatives, she elected to undergo robotic right nephrectomy.    Findings:   - 1 artery, 1 vein  - stent and kidney removed together, minimal perinephric adhesions  - distal ureter clipped    EBL: 25 mL    Drains:   1.  16 Fr gray catheter    Anesthesia: General endotracheal anesthesia    Complications:  none    Procedure in Detail: After discussion of risks and benefits of the procedure, informed consent was obtained.  The patient was brought to the operating room and placed supine on the operating table.  SCDs were applied and working prior to induction of anesthesia. General anesthesia was administered.  A 16 Fr Gray catheter was placed in the standard fashion. An OG tube was placed.  The patient was then moved into the modified flank position with the right side up. The patient was appropriately padded and secured to the table. The patient was then prepped and draped in the usual sterile fashion.  Timeout was performed and preoperative antibiotics were confirmed.      An incision was made over the camera port, and a Veress needle was introduced into the abdomen. Entry into the peritoneal cavity was confirmed with the drop test and an initial insufflation pressure of <10mmHg. Aspiration during  our drop test revealed no blood or succus.  The peritoneal cavity was insufflated up to 15 mmHg and the Veress was removed. The visiport and 5 mm camera within the 8 mm camera trocar were inserted under direct vision. The abdomen was inspected and found to be free of bowel or vascular injury. Using direct vision the other 3 robotic ports were placed, just below the right costal margin and and along the lower right abdomen, ensuring at least 8 cm between ports to allow robotic mobility. A 12 mm assistant port was placed in the lower midline and a 5 mm liver retractor was placed. A 5 mm airseal port was inserted in the midline as well. Each trocar was introduced under direct vision ensuring no injury to the underlying abdominal contents.      Dissection began along the white line of Toldt, reflecting the colon medially away from the kidney. The psoas muscle was identified. Care was taken to avoid injury to the duodenum.  Once the colon was reflected, dissection was carried out just below the kidney, and the ureter and gonadal vein were identified. Gerota's fascia was opened and the gonadal vein was traced superiorly to near the insertion on the IVC.     Careful dissection of the hilum was performed. The renal vein was identified anteriorly and somewhat superior. The renal artery was identified posterior and inferior to the renal vein. The artery and vein were each isolated circumferentially.   The stapler was inserted and the artery and then vein were taken separately. Hemostasis was excellent.    The ureter was identified distally.  An incision was made into the ureter, and the ureteral stent was identified.  This appeared to be the distal most portion of the stent.  The distal ureter to this area was clipped.  The surrounding attachments of the kidney were taken down with electrocautery and blunt dissection. The kidney was placed into an EndoCatch bag via the 12mm upper midline assistant port.    There was a small  opening on the capsule of the liver that was noted but was not actively bleeding. FloSeal was placed and surgicell was placed to cover it.  The remainder of the abdomen appeared hemostatic.    The robot was undocked. The 12 mm midline incision was extended, and the specimen was removed.  The extraction site fascia was closed using #1 looped PDS in a running fashion.  The fascia was examined with the camera, and there no evidence of bowel injury or entrapment.  The remaining trocars were removed under direct vision.    All skin incisions were closed using 4-0 monocryl.  Exparel was injected for local anesthesia.  Dermabond was applied to all incisions.  All counts were correct.    The patient tolerated the procedure well and was transferred to the recovery room in stable condition.    Disposition: The patient will remain on the urology service overnight for observation.     Andrea Diamond MD

## 2020-09-30 NOTE — OR NURSING
Pt drowsy, but awakens easily to voice. VSS on 3 L NC. Lap sites x 7 c/d/i with dermabond. Willis catheter 20fr intact and draining. Family called and updated. Awaiting room to be cleaned.     Handoff report given to Cathy PACU JOLENE.

## 2020-09-30 NOTE — NURSING
5:08 PM  Received to room from PACU, RN   VSS on o2/2L & afebrile. AOx4   Oriented to room, POC, & all equipment.   Surgical dressing CDI -x6 Umbilical site to be reinforced  w/ Best secure. Bowel sounds active, clear liquid diet initiated. Fall precautions implemented & call light in reach. Resting comfortably in low bed, in NAD.

## 2020-09-30 NOTE — TRANSFER OF CARE
Anesthesia Transfer of Care Note    Patient: Tamara Frausto    Procedure(s) Performed: Procedure(s) (LRB):  XI ROBOTIC NEPHRECTOMY (Right)    Patient location: PACU    Anesthesia Type: general    Transport from OR: Transported from OR on 2-3 L/min O2 by NC with adequate spontaneous ventilation    Post pain: adequate analgesia    Post assessment: no apparent anesthetic complications    Post vital signs: stable    Level of consciousness: awake, alert and oriented    Nausea/Vomiting: no nausea/vomiting    Complications: none    Transfer of care protocol was followed      Last vitals:   Visit Vitals  /71   Pulse 85   Temp 36.3 °C (97.3 °F) (Temporal)   Resp 16   Wt 64.9 kg (143 lb)   SpO2 95%   Breastfeeding No   BMI 24.55 kg/m²

## 2020-09-30 NOTE — INTERVAL H&P NOTE
The patient has been examined and the H&P has been reviewed:    I concur with the findings and no changes have occurred since H&P was written.    Surgery risks, benefits and alternative options discussed and understood by patient/family.          Active Hospital Problems    Diagnosis  POA    Atrophy of kidney (terminal) [N26.1]  Yes      Resolved Hospital Problems   No resolved problems to display.

## 2020-09-30 NOTE — PROGRESS NOTES
post op    Doing well in pacu  Af vss  Ab soft and benign  Urine clear and good output    Sp right robotic nephrectomy for nonfunctioning kidney with calcified stent    Routine post op

## 2020-10-01 VITALS
HEART RATE: 79 BPM | TEMPERATURE: 98 F | DIASTOLIC BLOOD PRESSURE: 56 MMHG | HEIGHT: 64 IN | RESPIRATION RATE: 18 BRPM | BODY MASS INDEX: 24.42 KG/M2 | WEIGHT: 143.06 LBS | SYSTOLIC BLOOD PRESSURE: 118 MMHG | OXYGEN SATURATION: 93 %

## 2020-10-01 LAB
ANION GAP SERPL CALC-SCNC: 12 MMOL/L (ref 8–16)
BASOPHILS # BLD AUTO: 0.03 K/UL (ref 0–0.2)
BASOPHILS NFR BLD: 0.3 % (ref 0–1.9)
BUN SERPL-MCNC: 11 MG/DL (ref 6–20)
CALCIUM SERPL-MCNC: 8.7 MG/DL (ref 8.7–10.5)
CHLORIDE SERPL-SCNC: 103 MMOL/L (ref 95–110)
CO2 SERPL-SCNC: 24 MMOL/L (ref 23–29)
CREAT SERPL-MCNC: 0.9 MG/DL (ref 0.5–1.4)
DIFFERENTIAL METHOD: ABNORMAL
EOSINOPHIL # BLD AUTO: 0 K/UL (ref 0–0.5)
EOSINOPHIL NFR BLD: 0.3 % (ref 0–8)
ERYTHROCYTE [DISTWIDTH] IN BLOOD BY AUTOMATED COUNT: 12.5 % (ref 11.5–14.5)
EST. GFR  (AFRICAN AMERICAN): >60 ML/MIN/1.73 M^2
EST. GFR  (NON AFRICAN AMERICAN): >60 ML/MIN/1.73 M^2
GLUCOSE SERPL-MCNC: 109 MG/DL (ref 70–110)
HCT VFR BLD AUTO: 37.9 % (ref 37–48.5)
HGB BLD-MCNC: 12.3 G/DL (ref 12–16)
IMM GRANULOCYTES # BLD AUTO: 0.03 K/UL (ref 0–0.04)
IMM GRANULOCYTES NFR BLD AUTO: 0.3 % (ref 0–0.5)
LYMPHOCYTES # BLD AUTO: 2 K/UL (ref 1–4.8)
LYMPHOCYTES NFR BLD: 19.2 % (ref 18–48)
MCH RBC QN AUTO: 30.8 PG (ref 27–31)
MCHC RBC AUTO-ENTMCNC: 32.5 G/DL (ref 32–36)
MCV RBC AUTO: 95 FL (ref 82–98)
MONOCYTES # BLD AUTO: 0.3 K/UL (ref 0.3–1)
MONOCYTES NFR BLD: 3.1 % (ref 4–15)
NEUTROPHILS # BLD AUTO: 8 K/UL (ref 1.8–7.7)
NEUTROPHILS NFR BLD: 76.8 % (ref 38–73)
NRBC BLD-RTO: 0 /100 WBC
PLATELET # BLD AUTO: 201 K/UL (ref 150–350)
PMV BLD AUTO: 9.5 FL (ref 9.2–12.9)
POTASSIUM SERPL-SCNC: 3.8 MMOL/L (ref 3.5–5.1)
RBC # BLD AUTO: 3.99 M/UL (ref 4–5.4)
SODIUM SERPL-SCNC: 139 MMOL/L (ref 136–145)
WBC # BLD AUTO: 10.35 K/UL (ref 3.9–12.7)

## 2020-10-01 PROCEDURE — 36415 COLL VENOUS BLD VENIPUNCTURE: CPT

## 2020-10-01 PROCEDURE — 90471 IMMUNIZATION ADMIN: CPT | Performed by: STUDENT IN AN ORGANIZED HEALTH CARE EDUCATION/TRAINING PROGRAM

## 2020-10-01 PROCEDURE — 25000003 PHARM REV CODE 250: Performed by: STUDENT IN AN ORGANIZED HEALTH CARE EDUCATION/TRAINING PROGRAM

## 2020-10-01 PROCEDURE — 80048 BASIC METABOLIC PNL TOTAL CA: CPT

## 2020-10-01 PROCEDURE — 85025 COMPLETE CBC W/AUTO DIFF WBC: CPT

## 2020-10-01 PROCEDURE — 90686 IIV4 VACC NO PRSV 0.5 ML IM: CPT | Performed by: STUDENT IN AN ORGANIZED HEALTH CARE EDUCATION/TRAINING PROGRAM

## 2020-10-01 PROCEDURE — 63600175 PHARM REV CODE 636 W HCPCS: Performed by: STUDENT IN AN ORGANIZED HEALTH CARE EDUCATION/TRAINING PROGRAM

## 2020-10-01 RX ORDER — POLYETHYLENE GLYCOL 3350 17 G/17G
17 POWDER, FOR SOLUTION ORAL DAILY
Qty: 1 BOTTLE | Refills: 0 | COMMUNITY
Start: 2020-10-01

## 2020-10-01 RX ORDER — ACETAMINOPHEN 500 MG
500 TABLET ORAL EVERY 6 HOURS
Qty: 30 TABLET | Refills: 0 | Status: SHIPPED | OUTPATIENT
Start: 2020-10-01 | End: 2020-10-09

## 2020-10-01 RX ORDER — HEPARIN SODIUM 5000 [USP'U]/ML
5000 INJECTION, SOLUTION INTRAVENOUS; SUBCUTANEOUS EVERY 8 HOURS
Status: DISCONTINUED | OUTPATIENT
Start: 2020-10-01 | End: 2020-10-01 | Stop reason: HOSPADM

## 2020-10-01 RX ORDER — OXYCODONE HYDROCHLORIDE 5 MG/1
5 TABLET ORAL EVERY 6 HOURS PRN
Qty: 10 TABLET | Refills: 0 | Status: SHIPPED | OUTPATIENT
Start: 2020-10-01

## 2020-10-01 RX ORDER — SULFAMETHOXAZOLE AND TRIMETHOPRIM 400; 80 MG/1; MG/1
1 TABLET ORAL 2 TIMES DAILY
Qty: 12 TABLET | Refills: 0 | Status: SHIPPED | OUTPATIENT
Start: 2020-10-01 | End: 2020-10-07

## 2020-10-01 RX ADMIN — OXYCODONE 5 MG: 5 TABLET ORAL at 07:10

## 2020-10-01 RX ADMIN — KETOROLAC TROMETHAMINE 15 MG: 30 INJECTION, SOLUTION INTRAMUSCULAR at 12:10

## 2020-10-01 RX ADMIN — ACETAMINOPHEN 1000 MG: 500 TABLET, FILM COATED ORAL at 12:10

## 2020-10-01 RX ADMIN — ACETAMINOPHEN 1000 MG: 500 TABLET, FILM COATED ORAL at 05:10

## 2020-10-01 RX ADMIN — INFLUENZA VIRUS VACCINE 0.5 ML: 15; 15; 15; 15 SUSPENSION INTRAMUSCULAR at 07:10

## 2020-10-01 RX ADMIN — CEFTRIAXONE 1 G: 1 INJECTION, SOLUTION INTRAVENOUS at 07:10

## 2020-10-01 RX ADMIN — BUPROPION HYDROCHLORIDE 150 MG: 150 TABLET, FILM COATED, EXTENDED RELEASE ORAL at 09:10

## 2020-10-01 RX ADMIN — KETOROLAC TROMETHAMINE 15 MG: 30 INJECTION, SOLUTION INTRAMUSCULAR at 05:10

## 2020-10-01 NOTE — SUBJECTIVE & OBJECTIVE
Interval History: POD1. No acute events. VSS. Did well overnight. Pain minimal and controlled by PO meds. Ambulating. Tolerating diet.    Review of Systems  Objective:     Temp:  [97.3 °F (36.3 °C)-98 °F (36.7 °C)] 97.4 °F (36.3 °C)  Pulse:  [64-93] 69  Resp:  [16-20] 20  SpO2:  [93 %-97 %] 95 %  BP: (107-157)/(55-84) 115/65     Body mass index is 24.55 kg/m².           Drains     Drain                 Urethral Catheter 09/30/20 1055 Double-lumen;Latex 20 Fr. less than 1 day                Physical Exam   Nursing note and vitals reviewed.  Constitutional: She is oriented to person, place, and time. She appears well-developed. No distress.   Cardiovascular: Normal rate.    Pulmonary/Chest: Effort normal. No accessory muscle usage. No respiratory distress.   Abdominal: Soft. She exhibits no distension. There is no abdominal tenderness.   Incisions c/d/i with dermabond, dressing over umbilical incision   Genitourinary:    Genitourinary Comments: Best draining clear yellow urine     Neurological: She is alert and oriented to person, place, and time.       Significant Labs:    BMP:  Recent Labs   Lab 09/28/20  1200      K 4.4      CO2 24   BUN 12   CREATININE 1.0   CALCIUM 9.5       CBC:   Recent Labs   Lab 09/28/20  1200   WBC 7.73   HGB 15.6   HCT 47.8          All pertinent labs results from the past 24 hours have been reviewed.    Significant Imaging:  All pertinent imaging results/findings from the past 24 hours have been reviewed.

## 2020-10-01 NOTE — DISCHARGE SUMMARY
[facility]  Urology  Discharge Summary      Patient Name: Tamara Frausto  MRN: [id number]  Admission Date: 9/30/2020  Hospital Length of Stay: 1 days  Discharge Date and Time: 10/1/2020  2:17 PM  Attending Physician: Yaron Ross MD   Discharging Provider: Andrea Diamond MD  Primary Care Physician: Primary Doctor No    HPI:   Tamara Frausto is a 55 y.o. female with retained ureteral stent and atrophic kidney who underwent right Nx 8/31/20     Procedure(s) (LRB):  XI ROBOTIC NEPHRECTOMY (Right)     Indwelling Lines/Drains at time of discharge:   Lines/Drains/Airways     None                 Hospital Course (synopsis of major diagnoses, care, treatment, and services provided during the course of the hospital stay):  Patient [place] [facility] [place] above procedure. Tolerated the procedure well and was transferred to the floor after recovery from anesthesia. Please see the operative note for further procedure details. Vitals remained stable throughout the post operative period. Physical exam was appropriate for post operative state. The gray was removed on POD1 and the patient was able to void without difficulty. Diet was advanced, and the patient was able to tolerate a regular diet prior to discharge. Patient was ambulating on POD0 without issues. Patient was deemed suitable for discharge on 10/1/2020  2:17 PM.      Medications and instructions as below.  For more thorough information, please refer to the hospital record and operative report.    Consults:     Significant Diagnostic Studies:     Pending Diagnostic Studies:     Procedure Component Value Units Date/Time    Specimen to Pathology, Surgery Urology [[id number]] Collected: 09/30/20 1332    Order Status: Sent Lab Status: In process Updated: 09/30/20 1520          Final Active Diagnoses:    Diagnosis Date Noted POA    PRINCIPAL PROBLEM:  Atrophy of kidney (terminal) [N26.1] 09/30/2020 Yes    Retained ureteral stent  [Z96.0] 08/31/2020 Not Applicable      Problems Resolved During this Admission:       Discharged Condition: good    Disposition: Home or Self Care    Follow Up:  Follow-up Information     Cherelle Feliciano NP In 2 weeks.    Specialty: Urology  Why: Post-op follow up appointment  Contact information:  [address] [place] [id number]  [phone]                 Patient Instructions:      Lifting restrictions   Order Comments: Do not drive while taking pain medications. No strenuous activity or lifting greater than 10 pounds for 4 weeks.     Notify your health care provider if you experience any of the following:  temperature >100.4     Notify your health care provider if you experience any of the following:  persistent nausea and vomiting or diarrhea     Notify your health care provider if you experience any of the following:  severe uncontrolled pain     Notify your health care provider if you experience any of the following:  redness, tenderness, or signs of infection (pain, swelling, redness, odor or green/yellow discharge around incision site)     Notify your health care provider if you experience any of the following:  difficulty breathing or increased cough     Notify your health care provider if you experience any of the following:  severe persistent headache     Notify your health care provider if you experience any of the following:  worsening rash     Notify your health care provider if you experience any of the following:  persistent dizziness, light-headedness, or visual disturbances     Notify your health care provider if you experience any of the following:  increased confusion or weakness     No dressing needed   Order Comments: Do not soak incisions in tub or bath and do not scrub incisions. You may shower over incisions. If you have surgical glue in place, the glue will come off over the next few weeks.     Medications:  Reconciled Home Medications:      Medication List      START taking  these medications    acetaminophen 500 MG tablet  Commonly known as: TYLENOL  Take 1 tablet (500 mg total) by mouth every 6 (six) hours. Take 1 or 2 tablets as every 6 hours. Do not exceed 4000 mg per 24 hours for 30 doses     oxyCODONE 5 MG immediate release tablet  Commonly known as: ROXICODONE  Take 1 tablet (5 mg total) by mouth every 6 (six) hours as needed for Pain.     polyethylene glycol 17 gram/dose powder  Commonly known as: GLYCOLAX  Take 17 g by mouth once daily.     sulfamethoxazole-trimethoprim 400-80mg 400-80 mg per tablet  Commonly known as: BACTRIM,SEPTRA  Take 1 tablet by mouth 2 (two) times daily. for 6 days        CONTINUE taking these medications    ALPRAZolam 1 MG tablet  Commonly known as: XANAX     oxybutynin 5 MG Tab  Commonly known as: DITROPAN  Take 1 tablet (5 mg total) by mouth 3 (three) times daily as needed (bladder spasm).     phenazopyridine 100 MG tablet  Commonly known as: PYRIDIUM  Take 2 tablets (200 mg total) by mouth every 8 (eight) hours as needed (burning with urination).     traZODone 150 MG tablet  Commonly known as: DESYREL     WELLBUTRIN  MG TB24 tablet  Generic drug: buPROPion  Take 150 mg by mouth once daily.            Time spent on the discharge of patient: 15 minutes    Andrea Diamond MD  Urology  [facility]

## 2020-10-01 NOTE — DISCHARGE INSTRUCTIONS
Discharge Instructions for Nephrectomy  You had a procedure to remove a kidney. This is called a nephrectomy. This procedure was done because one of your kidneys was not working properly or because there was a tumor. You can live a normal, healthy life with one kidney.  Home care  · Shower as necessary.  · Dont swim or use a bathtub or hot tub until after your follow-up appointment and your healthcare provider says it is OK.  · Keep your incision clean and dry. Wash your incision gently with mild soap and warm water and pat it dry.  · Eat a healthy, well-balanced diet.  · Drink 6 to 8 glasses of water a day unless your healthcare provider tells you to limit your fluids.  · Avoid constipation.  ¨ Use laxatives, stool softeners, or enemas as directed by your healthcare provider.  ¨ Eat more high-fiber foods.  · Dont drive until you are off your pain medicine and pain-free. This may take 2 to 4 weeks.  · Dont worry if you feel more tired than usual. Fatigue and weakness are common for a few weeks after this surgery.  · Limit your activity to short walks. Gradually increase your pace and distance as you feel able.  · Avoid strenuous activities, such as mowing the lawn, vacuuming, or playing sports.  · Dont lift anything heavier than 10 pounds for 4 weeks.  · Listen to your body. If an activity causes pain, stop.  · If you were asked to stop any medicines before the surgery, be sure to ask the healthcare provider when you may restart taking them. This is especially important in the case of blood thinners (anticoagulants or antiplatelet agents).  Follow-up care  Follow up with your healthcare provider, or as advised.  When to seek medical care  Call your healthcare provider right away if you have any of the following:  · Fever of 100.4°F (38°C) or higher, or as directed by your healthcare provider  · Redness, swelling, warmth, or pain at your incision site  · Drainage or pus from your incision  · Nausea or  vomiting  · Increased pain  · Noticeable decrease in urine output  · Blood in your urine   Date Last Reviewed: 2/1/2017  © 4821-3742 [employer]. [address] [place] [address], [place] [address]. All rights reserved. This information is not intended as a substitute for professional medical care. Always follow your healthcare professional's instructions.

## 2020-10-01 NOTE — HPI
Tamara Frausto is a 55 y.o. female with retained ureteral stent and atrophic kidney who underwent right Nx 8/31/20

## 2020-10-01 NOTE — PROGRESS NOTES
[facility]  Urology  Progress Note    Patient Name: Tamara Frausto  MRN: [id number]  Admission Date: 9/30/2020  Hospital Length of Stay: 1 days  Code Status: Prior   Attending Provider: Yaron Ross MD   Primary Care Physician: Primary Doctor No    Subjective:     HPI:  Tamara Frausto is a 55 y.o. female with retained ureteral stent and atrophic kidney who underwent right Nx 8/31/20    Interval History: POD1. No acute events. VSS. Did well overnight. Pain minimal and controlled by PO meds. Ambulating. Tolerating diet.    Review of Systems  Objective:     Temp:  [97.3 °F (36.3 °C)-98 °F (36.7 °C)] 97.4 °F (36.3 °C)  Pulse:  [64-93] 69  Resp:  [16-20] 20  SpO2:  [93 %-97 %] 95 %  BP: (107-157)/(55-84) 115/65     Body mass index is 24.55 kg/m².           Drains     Drain                 Urethral Catheter 09/30/20 1055 Double-lumen;Latex 20 Fr. less than 1 day                Physical Exam   Nursing note and vitals reviewed.  Constitutional: She is oriented to person, place, and time. She appears well-developed. No distress.   Cardiovascular: Normal rate.    Pulmonary/Chest: Effort normal. No accessory muscle usage. No respiratory distress.   Abdominal: Soft. She exhibits no distension. There is no abdominal tenderness.   Incisions c/d/i with dermabond, dressing over umbilical incision   Genitourinary:    Genitourinary Comments: Best draining clear yellow urine     Neurological: She is alert and oriented to person, place, and time.       Significant Labs:    BMP:  Recent Labs   Lab 09/28/20  1200      K 4.4      CO2 24   BUN 12   CREATININE 1.0   CALCIUM 9.5       CBC:   Recent Labs   Lab 09/28/20  1200   WBC 7.73   HGB 15.6   HCT 47.8          All pertinent labs results from the past 24 hours have been reviewed.    Significant Imaging:  All pertinent imaging results/findings from the past 24 hours have been reviewed.        Assessment/Plan:     * Atrophy of kidney  (terminal)  - PO tylenol, PO oxycodone for pain control, toradol  - regular diet  - d/c MIVF  - CBC, BMP pending  - Ambulate QID  - Drains: d/c Best this AM, voiding trial today  - Prophylaxis: IS, SCDs, GI ppx     Dispo: d/c today if labs remain stable        VTE Risk Mitigation (From admission, onward)         Ordered     IP VTE LOW RISK PATIENT  Once      09/30/20 0832     Place sequential compression device  Until discontinued      09/30/20 0832     Place NADYA hose  Until discontinued      09/30/20 0832                Andrea Diamond MD  Urology  [facility]

## 2020-10-01 NOTE — ASSESSMENT & PLAN NOTE
- PO tylenol, PO oxycodone for pain control, toradol  - regular diet  - d/c MIVF  - CBC, BMP pending  - Ambulate QID  - Drains: d/c Estephania this AM, voiding trial today  - Prophylaxis: IS, SCDs, GI ppx     Dispo: d/c today if labs remain stable

## 2020-10-05 LAB
COMMENT: NORMAL
FINAL PATHOLOGIC DIAGNOSIS: NORMAL
GROSS: NORMAL
Lab: NORMAL

## 2020-10-07 ENCOUNTER — PATIENT MESSAGE (OUTPATIENT)
Dept: UROLOGY | Facility: CLINIC | Age: 55
End: 2020-10-07

## 2020-10-20 ENCOUNTER — OFFICE VISIT (OUTPATIENT)
Dept: UROLOGY | Facility: CLINIC | Age: 55
End: 2020-10-20
Payer: COMMERCIAL

## 2020-10-20 VITALS
HEART RATE: 72 BPM | HEIGHT: 64 IN | SYSTOLIC BLOOD PRESSURE: 109 MMHG | WEIGHT: 143 LBS | BODY MASS INDEX: 24.41 KG/M2 | DIASTOLIC BLOOD PRESSURE: 71 MMHG

## 2020-10-20 DIAGNOSIS — Z90.5 S/P NEPHRECTOMY: Primary | ICD-10-CM

## 2020-10-20 PROCEDURE — 99024 PR POST-OP FOLLOW-UP VISIT: ICD-10-PCS | Mod: S$GLB,,, | Performed by: NURSE PRACTITIONER

## 2020-10-20 PROCEDURE — 99024 POSTOP FOLLOW-UP VISIT: CPT | Mod: S$GLB,,, | Performed by: NURSE PRACTITIONER

## 2020-10-20 NOTE — PROGRESS NOTES
Subjective:      Tamara Frausto is a 55 y.o. female who returns today for a post operative visit. She is an established patient of Dr. Ross' and is new to me today.    The patient has a history of a retained right ureteral stent for 18 years. The stent had fractured and the distal portion of the stent was removed along with cystolitholapaxy which was previously done with Dr. Viera.  Mag 3 renal scan shows a nonfunctional right kidney.  She is now s/p robotic nephrectomy by Dr. Ross on 9/30/20. Creatinine on discharged was 0.9, her baseline.     She returns today for wound check. Doing great! States that over the last week, she has improved significantly. She reports improvement in urination. Denies dysuria, frequency and urgency. Denies fever/chills and gross hematuria.     The following portions of the patient's history were reviewed and updated as appropriate: allergies, current medications, past family history, past medical history, past social history, past surgical history and problem list.    Review of Systems  Constitutional: no fever or chills  ENT: no nasal congestion or sore throat  Respiratory: no cough or shortness of breath  Cardiovascular: no chest pain or palpitations  Gastrointestinal: no nausea or vomiting, tolerating diet  Genitourinary: as per HPI  Hematologic/Lymphatic: no easy bruising or lymphadenopathy  Musculoskeletal: no arthralgias or myalgias  Neurological: no seizures or tremors  Behavioral/Psych: no auditory or visual hallucinations     Objective:   Vital Signs:   Vitals:    10/20/20 1329   BP: 109/71   Pulse: 72       Physical Exam   General: alert and oriented, no acute distress  Head: normocephalic, atraumatic  Neck: supple, no lymphadenopathy, normal ROM, no masses  Respiratory: Symmetric expansion, non-labored breathing  Cardiovascular: regular rate and rhythm, nomal pulses, no peripheral edema  Abdomen: soft, non tender, non distended, no palpable masses, no hernias,  no hepatomegaly or splenomegaly; abdominal incisions closed with dermabond, healing well  Pelvic: deferred  Lymphatic: no inguinal nodes  Skin: normal coloration and turgor, no rashes, no suspicious skin lesions noted  Neuro: alert and oriented x3, no gross deficits  Psych: normal judgment and insight, normal mood/affect and non-anxious  No CVA tenderness    Lab Review   Urinalysis demonstrates : no specimen  Lab Results   Component Value Date    WBC 10.35 10/01/2020    HGB 12.3 10/01/2020    HCT 37.9 10/01/2020    MCV 95 10/01/2020     10/01/2020     Lab Results   Component Value Date    CREATININE 0.9 10/01/2020    BUN 11 10/01/2020     FINAL PATHOLOGY  Right kidney, nephrectomy:Kidney showing grossly dilated collecting system marked with microscopically congested vasculature and chronic inflammation and reactive changes. No evidence of malignancy.    Imaging   None    Assessment:   S/p nephrectomy   Plan:   Tamara was seen today for follow-up.    Diagnoses and all orders for this visit:    S/p nephrectomy    Plan:  --Recovering well!  --Continue to avoid heavy lifting >10 lbs for a total of 6 weeks   --Follow up with Dr. Rosa in [place]

## 2020-10-20 NOTE — LETTER
October 20, 2020      [facility]  [address] [facility], [address] [address]  Phone: [phone]  Fax: [phone]       Patient: Tamara Frausto   YOB: 1965  Date of Visit: 10/20/2020    To Whom It May Concern:    Carol Frausto was under the care of Dr. Yaron Ross at [facility] for her chronic care condition, from 9/30/20 until 10/26/20. She may return to work on 10/26/20 with no restrictions. If you have any questions or concerns, or if I can be of further assistance, please do not hesitate to contact me.    Sincerely,        Cherelle Feliciano, NP

## 2020-10-20 NOTE — LETTER
October 20, 2020      [facility]  [address] [facility], [address] [address]  Phone: [phone]  Fax: [phone]       Patient: Tamara Frausto   YOB: 1965  Date of Visit: 10/20/2020    To Whom It May Concern:    Carol Frausto participated in a virtual visit with Dr. Ross on 9/16/20. Please excuse her absence. If you have any questions or concerns, or if I can be of further assistance, please do not hesitate to contact me.    Sincerely,        Cherelle Feliciano, NP

## (undated) DEVICE — SOL WATER STRL IRR 1000ML

## (undated) DEVICE — SOL ELECTROLUBE ANTI-STIC

## (undated) DEVICE — HEMOSTAT SURGICEL 4X8IN

## (undated) DEVICE — SET TRI-LUMEN FILTERED TUBE

## (undated) DEVICE — NDL SAFETY 22G X 1.5 ECLIPSE

## (undated) DEVICE — Device

## (undated) DEVICE — SET CYSTO IRRIGATION UNIV SPIK

## (undated) DEVICE — CLIP HEMO-LOK MLX LARGE LF

## (undated) DEVICE — ELECTRODE REM PLYHSV RETURN 9

## (undated) DEVICE — GLOVE BIOGEL SKINSENSE PI 7.0

## (undated) DEVICE — SET BASIN 48X48IN 6000ML RING

## (undated) DEVICE — SUT ABS CLIP LAPRA-TY CTD

## (undated) DEVICE — SEE MEDLINE ITEM 156923

## (undated) DEVICE — SEALER VESSEL EXTEND

## (undated) DEVICE — STAPLER SKIN PROXIMATE WIDE

## (undated) DEVICE — BRUSH SCRUB SURGICALW/BETADINE

## (undated) DEVICE — SEAL UNIVERSAL 5MM-8MM XI

## (undated) DEVICE — FIBER 1000 MICRON HOLMIUM

## (undated) DEVICE — OBTURATOR BLADELESS 8MM XI CLR

## (undated) DEVICE — TROCAR ENDOPATH XCEL 12X100MM

## (undated) DEVICE — SOL IRR NACL .9% 3000ML

## (undated) DEVICE — KIT SURGIFLO HEMOSTATIC MATRIX

## (undated) DEVICE — CATH CONE TIP

## (undated) DEVICE — SET IRR URLGY 2LINE UNIV SPIKE

## (undated) DEVICE — STAPLER ECHELON FLEX GST 45MM

## (undated) DEVICE — SUT CTD VICRYL 0 UND BR SUT

## (undated) DEVICE — EVACUATOR BLADDER UROVAC ADPT

## (undated) DEVICE — DRESSING TELFA N ADH 3X8

## (undated) DEVICE — DRAPE ARM DAVINCI XI

## (undated) DEVICE — CONTAINER SPECIMEN STRL 4OZ

## (undated) DEVICE — DRAPE UNDER BUTTOCKS WITH POUCH

## (undated) DEVICE — ADHESIVE DERMABOND ADVANCED

## (undated) DEVICE — RELOAD ECHELON ENDOPATH 45MM

## (undated) DEVICE — SPONGE COTTON TRAY 4X4IN

## (undated) DEVICE — IRRIGATOR ENDOSCOPY DISP.

## (undated) DEVICE — UNDERGLOVES BIOGEL PI SZ 7 LF

## (undated) DEVICE — APPLICATOR ENDOSCOPIC

## (undated) DEVICE — DRAPE COLUMN DAVINCI XI

## (undated) DEVICE — SOL CLEARIFY VISUALIZATION LAP

## (undated) DEVICE — BAG URINARY DRN 2000ML

## (undated) DEVICE — SUT 1 48IN PDS II VIO MONO

## (undated) DEVICE — CATH URTRL OPEN END STR TIP 5F

## (undated) DEVICE — GLOVE BIOGEL SKINSENSE PI 6.5

## (undated) DEVICE — SUT POLY CV-6 30 TT-13

## (undated) DEVICE — BAG INZII TISS RETRV 12/15MM

## (undated) DEVICE — CYBERWAND PROBE SET

## (undated) DEVICE — TROCAR ENDOPATH XCEL 5X100MM

## (undated) DEVICE — COVER TIP CURVED SCISSORS XI

## (undated) DEVICE — SPONGE GELFOAM 12-7MM

## (undated) DEVICE — PORT ACCESS 5MM W/120MM

## (undated) DEVICE — SUT MCRYL PLUS 4-0 PS2 27IN

## (undated) DEVICE — NDL INSUF ULTRA VERESS 120MM

## (undated) DEVICE — GLOVE BIOGEL SKINSENSE PI 7.5

## (undated) DEVICE — SOL 9P NACL IRR PIC IL

## (undated) DEVICE — SEE MEDLINE ITEM 152622